# Patient Record
Sex: MALE | Race: WHITE | Employment: OTHER | URBAN - METROPOLITAN AREA
[De-identification: names, ages, dates, MRNs, and addresses within clinical notes are randomized per-mention and may not be internally consistent; named-entity substitution may affect disease eponyms.]

---

## 2017-01-16 ENCOUNTER — HOSPITAL ENCOUNTER (OUTPATIENT)
Dept: OTHER | Age: 77
Discharge: HOME | End: 2017-01-31
Attending: FAMILY MEDICINE | Admitting: FAMILY MEDICINE

## 2017-01-19 PROBLEM — I63.40 CEREBRAL EMBOLISM WITH CEREBRAL INFARCTION (HCC): Status: ACTIVE | Noted: 2017-01-19

## 2017-01-19 LAB
POC INR: 2 INDEX
PROTHROMBIN TIME, POC: 23.8 SECONDS (ref 10–14.3)

## 2017-02-01 ENCOUNTER — HOSPITAL ENCOUNTER (OUTPATIENT)
Dept: OTHER | Age: 77
Discharge: HOME | End: 2017-02-28
Attending: FAMILY MEDICINE | Admitting: FAMILY MEDICINE

## 2017-02-20 LAB
POC INR: 2 INDEX
PROTHROMBIN TIME, POC: 24.4 SECONDS (ref 10–14.3)

## 2017-03-01 ENCOUNTER — HOSPITAL ENCOUNTER (OUTPATIENT)
Dept: OTHER | Age: 77
Discharge: HOME | End: 2017-03-31
Attending: FAMILY MEDICINE | Admitting: FAMILY MEDICINE

## 2017-03-20 LAB
POC INR: 2 INDEX
PROTHROMBIN TIME, POC: 24.1 SECONDS (ref 10–14.3)

## 2017-04-01 ENCOUNTER — HOSPITAL ENCOUNTER (OUTPATIENT)
Dept: OTHER | Age: 77
Discharge: HOME | End: 2017-04-30
Attending: FAMILY MEDICINE | Admitting: FAMILY MEDICINE

## 2017-04-17 LAB
POC INR: 2.2 INDEX
PROTHROMBIN TIME, POC: 26 SECONDS (ref 10–14.3)

## 2017-05-01 ENCOUNTER — HOSPITAL ENCOUNTER (OUTPATIENT)
Dept: GENERAL RADIOLOGY | Age: 77
Discharge: HOME | End: 2017-05-01
Attending: PHYSICIAN ASSISTANT | Admitting: PHYSICIAN ASSISTANT

## 2017-05-01 ENCOUNTER — HOSPITAL ENCOUNTER (OUTPATIENT)
Dept: OTHER | Age: 77
Discharge: HOME | End: 2017-05-31
Attending: FAMILY MEDICINE | Admitting: FAMILY MEDICINE

## 2017-05-01 LAB
ALBUMIN SERPL-MCNC: 4.6 GM/DL (ref 3.4–5)
ALP BLD-CCNC: 97 IU/L (ref 40–128)
ALT SERPL-CCNC: 32 U/L (ref 10–40)
ANION GAP SERPL CALCULATED.3IONS-SCNC: 13 MMOL/L (ref 4–16)
AST SERPL-CCNC: 31 IU/L (ref 15–37)
BILIRUB SERPL-MCNC: 0.5 MG/DL (ref 0–1)
BUN BLDV-MCNC: 14 MG/DL (ref 6–23)
CALCIUM SERPL-MCNC: 9.7 MG/DL (ref 8.3–10.6)
CHLORIDE BLD-SCNC: 99 MMOL/L (ref 99–110)
CHOLESTEROL: 134 MG/DL
CO2: 28 MMOL/L (ref 21–32)
CREAT SERPL-MCNC: 1 MG/DL (ref 0.9–1.3)
ESTIMATED AVERAGE GLUCOSE: 117 MG/DL
GFR AFRICAN AMERICAN: >60 ML/MIN/1.73M2
GFR NON-AFRICAN AMERICAN: >60 ML/MIN/1.73M2
GLUCOSE FASTING: 66 MG/DL (ref 70–99)
HBA1C MFR BLD: 5.7 % (ref 4.2–6.3)
HCT VFR BLD CALC: 47.5 % (ref 42–52)
HDLC SERPL-MCNC: 42 MG/DL
HEMOGLOBIN: 15.5 GM/DL (ref 13.5–18)
LDL CHOLESTEROL CALCULATED: 40 MG/DL
MCH RBC QN AUTO: 30 PG (ref 27–31)
MCHC RBC AUTO-ENTMCNC: 32.6 % (ref 32–36)
MCV RBC AUTO: 92.1 FL (ref 78–100)
PDW BLD-RTO: 12.9 % (ref 11.7–14.9)
PLATELET # BLD: 276 K/CU MM (ref 140–440)
PMV BLD AUTO: 9.3 FL (ref 7.5–11.1)
POTASSIUM SERPL-SCNC: 4.2 MMOL/L (ref 3.5–5.1)
RBC # BLD: 5.16 M/CU MM (ref 4.6–6.2)
SODIUM BLD-SCNC: 140 MMOL/L (ref 135–145)
T4 FREE: 1.03 NG/DL (ref 0.9–1.8)
TOTAL PROTEIN: 7.5 GM/DL (ref 6.4–8.2)
TRIGL SERPL-MCNC: 258 MG/DL
TSH HIGH SENSITIVITY: 3.15 UIU/ML (ref 0.27–4.2)
WBC # BLD: 7 K/CU MM (ref 4–10.5)

## 2017-05-03 LAB
PROSTATE SPECIFIC ANTIGEN FREE: 0.3
PROSTATE SPECIFIC ANTIGEN PERCENT FREE: 11
PROSTATE SPECIFIC ANTIGEN: 2.8

## 2017-05-15 LAB
POC INR: 1.8 INDEX
PROTHROMBIN TIME, POC: 21.5 SECONDS (ref 10–14.3)

## 2017-06-01 ENCOUNTER — HOSPITAL ENCOUNTER (OUTPATIENT)
Dept: OTHER | Age: 77
Discharge: HOME | End: 2017-06-30
Attending: FAMILY MEDICINE | Admitting: FAMILY MEDICINE

## 2017-06-19 LAB
POC INR: 2.4 INDEX
PROTHROMBIN TIME, POC: 28.9 SECONDS (ref 10–14.3)

## 2017-07-01 ENCOUNTER — HOSPITAL ENCOUNTER (OUTPATIENT)
Dept: OTHER | Age: 77
Discharge: HOME | End: 2017-07-31
Attending: FAMILY MEDICINE | Admitting: FAMILY MEDICINE

## 2017-07-20 PROBLEM — I69.393 ATAXIA DUE TO RECENT STROKE: Status: ACTIVE | Noted: 2017-07-20

## 2017-07-20 PROBLEM — R41.89 IMPAIRED COGNITION: Status: ACTIVE | Noted: 2017-07-20

## 2017-08-01 ENCOUNTER — HOSPITAL ENCOUNTER (OUTPATIENT)
Dept: OTHER | Age: 77
Discharge: HOME | End: 2017-08-31
Attending: FAMILY MEDICINE | Admitting: FAMILY MEDICINE

## 2017-08-14 LAB
POC INR: 3.6 INDEX
PROTHROMBIN TIME, POC: 43.4 SECONDS (ref 10–14.3)

## 2017-08-17 LAB
POC INR: 5.7 INDEX
PROTHROMBIN TIME, POC: 68.6 SECONDS (ref 10–14.3)

## 2017-08-21 LAB
POC INR: 1.4 INDEX
PROTHROMBIN TIME, POC: 16.7 SECONDS (ref 10–14.3)

## 2017-08-28 LAB
POC INR: 2 INDEX
PROTHROMBIN TIME, POC: 24 SECONDS (ref 10–14.3)

## 2017-09-01 ENCOUNTER — HOSPITAL ENCOUNTER (OUTPATIENT)
Dept: OTHER | Age: 77
Discharge: HOME | End: 2017-09-30
Attending: FAMILY MEDICINE | Admitting: FAMILY MEDICINE

## 2017-09-11 LAB
POC INR: 1.8 INDEX
PROTHROMBIN TIME, POC: 21.5 SECONDS (ref 10–14.3)

## 2017-09-25 LAB
POC INR: 1.3 INDEX
POC INR: 4.5 INDEX
PROTHROMBIN TIME, POC: 15.2 SECONDS (ref 10–14.3)
PROTHROMBIN TIME, POC: 53.9 SECONDS (ref 10–14.3)

## 2017-10-01 ENCOUNTER — HOSPITAL ENCOUNTER (OUTPATIENT)
Dept: OTHER | Age: 77
Discharge: HOME | End: 2017-10-31
Attending: FAMILY MEDICINE | Admitting: FAMILY MEDICINE

## 2017-10-02 LAB
POC INR: 1.7 INDEX
PROTHROMBIN TIME, POC: 20 SECONDS (ref 10–14.3)

## 2017-10-05 LAB
POC INR: 1.9 INDEX
PROTHROMBIN TIME, POC: 22.3 SECONDS (ref 10–14.3)

## 2017-10-16 LAB
POC INR: 1.7 INDEX
PROTHROMBIN TIME, POC: 20 SECONDS (ref 10–14.3)

## 2017-10-26 LAB
POC INR: 2.9 INDEX
PROTHROMBIN TIME, POC: 34.3 SECONDS (ref 10–14.3)

## 2017-11-01 ENCOUNTER — HOSPITAL ENCOUNTER (OUTPATIENT)
Dept: OTHER | Age: 77
Discharge: HOME | End: 2017-11-30
Attending: FAMILY MEDICINE | Admitting: INTERNAL MEDICINE

## 2017-11-07 ENCOUNTER — HOSPITAL ENCOUNTER (OUTPATIENT)
Dept: GENERAL RADIOLOGY | Age: 77
Discharge: HOME | End: 2017-11-07
Attending: NURSE PRACTITIONER | Admitting: NURSE PRACTITIONER

## 2017-11-07 LAB
ALBUMIN SERPL-MCNC: 4.9 GM/DL (ref 3.4–5)
ALP BLD-CCNC: 80 IU/L (ref 40–128)
ALT SERPL-CCNC: 65 U/L (ref 10–40)
ANION GAP SERPL CALCULATED.3IONS-SCNC: 12 MMOL/L (ref 4–16)
AST SERPL-CCNC: 54 IU/L (ref 15–37)
BILIRUB SERPL-MCNC: 0.6 MG/DL (ref 0–1)
BUN BLDV-MCNC: 19 MG/DL (ref 6–23)
CALCIUM SERPL-MCNC: 9.5 MG/DL (ref 8.3–10.6)
CHLORIDE BLD-SCNC: 105 MMOL/L (ref 99–110)
CHOLESTEROL: 141 MG/DL
CO2: 26 MMOL/L (ref 21–32)
CREAT SERPL-MCNC: 1 MG/DL (ref 0.9–1.3)
ESTIMATED AVERAGE GLUCOSE: 111 MG/DL
GFR AFRICAN AMERICAN: >60 ML/MIN/1.73M2
GFR NON-AFRICAN AMERICAN: >60 ML/MIN/1.73M2
GLUCOSE FASTING: 81 MG/DL (ref 70–99)
HBA1C MFR BLD: 5.5 % (ref 4.2–6.3)
HCT VFR BLD CALC: 46.1 % (ref 42–52)
HDLC SERPL-MCNC: 50 MG/DL
HEMOGLOBIN: 15.1 GM/DL (ref 13.5–18)
LDL CHOLESTEROL DIRECT: 70 MG/DL
MCH RBC QN AUTO: 30.6 PG (ref 27–31)
MCHC RBC AUTO-ENTMCNC: 32.8 % (ref 32–36)
MCV RBC AUTO: 93.3 FL (ref 78–100)
PDW BLD-RTO: 12.3 % (ref 11.7–14.9)
PLATELET # BLD: 204 K/CU MM (ref 140–440)
PMV BLD AUTO: 9.9 FL (ref 7.5–11.1)
POTASSIUM SERPL-SCNC: 4.4 MMOL/L (ref 3.5–5.1)
RBC # BLD: 4.94 M/CU MM (ref 4.6–6.2)
SODIUM BLD-SCNC: 143 MMOL/L (ref 135–145)
T4 FREE: 0.87 NG/DL (ref 0.9–1.8)
TOTAL PROTEIN: 7.4 GM/DL (ref 6.4–8.2)
TRIGL SERPL-MCNC: 171 MG/DL
TSH HIGH SENSITIVITY: 3.13 UIU/ML (ref 0.27–4.2)
WBC # BLD: 5.6 K/CU MM (ref 4–10.5)

## 2017-11-08 LAB
PROSTATE SPECIFIC ANTIGEN FREE: 0.4
PROSTATE SPECIFIC ANTIGEN PERCENT FREE: 12
PROSTATE SPECIFIC ANTIGEN: 3.2

## 2017-11-20 LAB
POC INR: 2.7 INDEX
PROTHROMBIN TIME, POC: 32.9 SECONDS (ref 10–14.3)

## 2017-12-01 ENCOUNTER — HOSPITAL ENCOUNTER (OUTPATIENT)
Dept: OTHER | Age: 77
Discharge: HOME | End: 2017-12-31
Attending: INTERNAL MEDICINE | Admitting: INTERNAL MEDICINE

## 2017-12-21 LAB
POC INR: 2.5 INDEX
PROTHROMBIN TIME, POC: 30.1 SECONDS (ref 10–14.3)

## 2018-01-01 ENCOUNTER — HOSPITAL ENCOUNTER (OUTPATIENT)
Dept: OTHER | Age: 78
Discharge: HOME | End: 2018-01-31
Attending: INTERNAL MEDICINE | Admitting: INTERNAL MEDICINE

## 2018-03-12 ENCOUNTER — HOSPITAL ENCOUNTER (OUTPATIENT)
Dept: SLEEP CENTER | Age: 78
Discharge: HOME | End: 2018-03-12
Attending: INTERNAL MEDICINE | Admitting: INTERNAL MEDICINE

## 2018-03-12 VITALS — HEIGHT: 72 IN | BODY MASS INDEX: 27.09 KG/M2 | WEIGHT: 200 LBS

## 2018-03-12 DIAGNOSIS — Z99.89 OSA ON CPAP: ICD-10-CM

## 2018-03-12 DIAGNOSIS — G47.33 OSA ON CPAP: ICD-10-CM

## 2018-09-18 ENCOUNTER — HOSPITAL ENCOUNTER (OUTPATIENT)
Dept: SLEEP CENTER | Age: 78
Discharge: HOME | End: 2018-09-18
Attending: INTERNAL MEDICINE | Admitting: INTERNAL MEDICINE

## 2018-09-18 DIAGNOSIS — G47.33 OBSTRUCTIVE SLEEP APNEA SYNDROME: ICD-10-CM

## 2018-09-19 NOTE — PROGRESS NOTES
9/19/2018  sleep study  for Rina Bird  1940 is complete. Results are pending physician review.     Electronically signed by Yudelka Redd on 9/19/2018 at 6:52 AM

## 2018-09-22 NOTE — PROGRESS NOTES
Results for the most recent sleep study on Kristi Mckeon  1940 are finalized and available. Please see media tab.     Electronically signed by Christine Anderson on 9/22/2018 at 5:34 AM

## 2018-10-30 ENCOUNTER — HOSPITAL ENCOUNTER (OUTPATIENT)
Age: 78
Discharge: HOME | End: 2018-10-30
Payer: MEDICARE

## 2018-10-30 LAB
ALBUMIN SERPL-MCNC: 4.3 GM/DL (ref 3.4–5)
ALP BLD-CCNC: 87 IU/L (ref 40–128)
ALT SERPL-CCNC: 49 U/L (ref 10–40)
ANION GAP SERPL CALCULATED.3IONS-SCNC: 17 MMOL/L (ref 4–16)
AST SERPL-CCNC: 43 IU/L (ref 15–37)
BILIRUB SERPL-MCNC: 0.5 MG/DL (ref 0–1)
BUN BLDV-MCNC: 22 MG/DL (ref 6–23)
CALCIUM SERPL-MCNC: 9.4 MG/DL (ref 8.3–10.6)
CHLORIDE BLD-SCNC: 103 MMOL/L (ref 99–110)
CHOLESTEROL: 170 MG/DL
CO2: 17 MMOL/L (ref 21–32)
CREAT SERPL-MCNC: 0.9 MG/DL (ref 0.9–1.3)
ESTIMATED AVERAGE GLUCOSE: 120 MG/DL
GFR AFRICAN AMERICAN: >60 ML/MIN/1.73M2
GFR NON-AFRICAN AMERICAN: >60 ML/MIN/1.73M2
GLUCOSE FASTING: 108 MG/DL (ref 70–99)
HBA1C MFR BLD: 5.8 % (ref 4.2–6.3)
HCT VFR BLD CALC: 51.6 % (ref 42–52)
HDLC SERPL-MCNC: 40 MG/DL
HEMOGLOBIN: 15.7 GM/DL (ref 13.5–18)
LDL CHOLESTEROL DIRECT: 90 MG/DL
MCH RBC QN AUTO: 30.7 PG (ref 27–31)
MCHC RBC AUTO-ENTMCNC: 30.4 % (ref 32–36)
MCV RBC AUTO: 101 FL (ref 78–100)
PDW BLD-RTO: 12.7 % (ref 11.7–14.9)
PLATELET # BLD: 203 K/CU MM (ref 140–440)
PMV BLD AUTO: 9.7 FL (ref 7.5–11.1)
POTASSIUM SERPL-SCNC: 4.7 MMOL/L (ref 3.5–5.1)
PROSTATE SPECIFIC ANTIGEN: 4.12 NG/ML (ref 0–4)
RBC # BLD: 5.11 M/CU MM (ref 4.6–6.2)
SODIUM BLD-SCNC: 137 MMOL/L (ref 135–145)
T4 FREE: 1.07 NG/DL (ref 0.9–1.8)
TOTAL PROTEIN: 7 GM/DL (ref 6.4–8.2)
TRIGL SERPL-MCNC: 361 MG/DL
TSH HIGH SENSITIVITY: 3.38 UIU/ML (ref 0.27–4.2)
WBC # BLD: 5.9 K/CU MM (ref 4–10.5)

## 2018-10-30 PROCEDURE — 85027 COMPLETE CBC AUTOMATED: CPT

## 2018-10-30 PROCEDURE — 84443 ASSAY THYROID STIM HORMONE: CPT

## 2018-10-30 PROCEDURE — 80061 LIPID PANEL: CPT

## 2018-10-30 PROCEDURE — 84439 ASSAY OF FREE THYROXINE: CPT

## 2018-10-30 PROCEDURE — G0103 PSA SCREENING: HCPCS

## 2018-10-30 PROCEDURE — 83721 ASSAY OF BLOOD LIPOPROTEIN: CPT

## 2018-10-30 PROCEDURE — 36415 COLL VENOUS BLD VENIPUNCTURE: CPT

## 2018-10-30 PROCEDURE — 83036 HEMOGLOBIN GLYCOSYLATED A1C: CPT

## 2018-10-30 PROCEDURE — 80053 COMPREHEN METABOLIC PANEL: CPT

## 2019-06-23 ENCOUNTER — APPOINTMENT (OUTPATIENT)
Dept: ULTRASOUND IMAGING | Age: 79
End: 2019-06-23
Payer: MEDICARE

## 2019-06-23 ENCOUNTER — HOSPITAL ENCOUNTER (OUTPATIENT)
Age: 79
Setting detail: OBSERVATION
Discharge: HOME | End: 2019-06-25
Attending: EMERGENCY MEDICINE | Admitting: INTERNAL MEDICINE
Payer: MEDICARE

## 2019-06-23 ENCOUNTER — APPOINTMENT (OUTPATIENT)
Dept: CT IMAGING | Age: 79
End: 2019-06-23
Payer: MEDICARE

## 2019-06-23 ENCOUNTER — APPOINTMENT (OUTPATIENT)
Dept: GENERAL RADIOLOGY | Age: 79
End: 2019-06-23
Payer: MEDICARE

## 2019-06-23 DIAGNOSIS — R41.82 ALTERED MENTAL STATUS, UNSPECIFIED ALTERED MENTAL STATUS TYPE: Primary | ICD-10-CM

## 2019-06-23 DIAGNOSIS — R79.89 ELEVATED LACTIC ACID LEVEL: ICD-10-CM

## 2019-06-23 DIAGNOSIS — R79.1 ELEVATED INR: ICD-10-CM

## 2019-06-23 PROBLEM — G93.40 ACUTE ENCEPHALOPATHY: Status: ACTIVE | Noted: 2019-06-23

## 2019-06-23 LAB
ALBUMIN SERPL-MCNC: 3.9 GM/DL (ref 3.4–5)
ALP BLD-CCNC: 96 IU/L (ref 40–129)
ALT SERPL-CCNC: 58 U/L (ref 10–40)
AMPHETAMINES: NEGATIVE
ANION GAP SERPL CALCULATED.3IONS-SCNC: 10 MMOL/L (ref 4–16)
AST SERPL-CCNC: 40 IU/L (ref 15–37)
BACTERIA: NEGATIVE /HPF
BARBITURATE SCREEN URINE: NEGATIVE
BASE EXCESS MIXED: ABNORMAL (ref 0–1.2)
BASOPHILS ABSOLUTE: 0 K/CU MM
BASOPHILS RELATIVE PERCENT: 0.5 % (ref 0–1)
BENZODIAZEPINE SCREEN, URINE: NEGATIVE
BILIRUB SERPL-MCNC: 0.4 MG/DL (ref 0–1)
BILIRUBIN URINE: NEGATIVE MG/DL
BLOOD, URINE: ABNORMAL
BUN BLDV-MCNC: 23 MG/DL (ref 6–23)
CALCIUM SERPL-MCNC: 9.6 MG/DL (ref 8.3–10.6)
CANNABINOID SCREEN URINE: NEGATIVE
CHLORIDE BLD-SCNC: 107 MMOL/L (ref 99–110)
CLARITY: CLEAR
CO2: 23 MMOL/L (ref 21–32)
COCAINE METABOLITE: NEGATIVE
COLOR: YELLOW
COMMENT: ABNORMAL
CREAT SERPL-MCNC: 1 MG/DL (ref 0.9–1.3)
DIFFERENTIAL TYPE: ABNORMAL
EOSINOPHILS ABSOLUTE: 0.1 K/CU MM
EOSINOPHILS RELATIVE PERCENT: 1 % (ref 0–3)
GFR AFRICAN AMERICAN: >60 ML/MIN/1.73M2
GFR NON-AFRICAN AMERICAN: >60 ML/MIN/1.73M2
GLUCOSE BLD-MCNC: 94 MG/DL
GLUCOSE BLD-MCNC: 94 MG/DL (ref 70–99)
GLUCOSE BLD-MCNC: 95 MG/DL (ref 70–99)
GLUCOSE, URINE: NEGATIVE MG/DL
HCO3 VENOUS: 23.8 MMOL/L (ref 19–25)
HCT VFR BLD CALC: 44 % (ref 42–52)
HEMOGLOBIN: 14.6 GM/DL (ref 13.5–18)
IMMATURE NEUTROPHIL %: 0.3 % (ref 0–0.43)
INR BLD: 5.75 INDEX
KETONES, URINE: NEGATIVE MG/DL
LACTATE: 1.3 MMOL/L (ref 0.4–2)
LACTATE: ABNORMAL MMOL/L (ref 0.4–2)
LEUKOCYTE ESTERASE, URINE: NEGATIVE
LIPASE: 31 IU/L (ref 13–60)
LYMPHOCYTES ABSOLUTE: 1.6 K/CU MM
LYMPHOCYTES RELATIVE PERCENT: 27.1 % (ref 24–44)
MCH RBC QN AUTO: 30.8 PG (ref 27–31)
MCHC RBC AUTO-ENTMCNC: 33.2 % (ref 32–36)
MCV RBC AUTO: 92.8 FL (ref 78–100)
MONOCYTES ABSOLUTE: 0.5 K/CU MM
MONOCYTES RELATIVE PERCENT: 7.9 % (ref 0–4)
MUCUS: ABNORMAL HPF
NITRITE URINE, QUANTITATIVE: NEGATIVE
NUCLEATED RBC %: 0 %
O2 SAT, VEN: 55.8 % (ref 50–70)
OPIATES, URINE: NEGATIVE
OXYCODONE: NORMAL
PCO2, VEN: 35 MMHG (ref 38–52)
PDW BLD-RTO: 12 % (ref 11.7–14.9)
PH VENOUS: 7.44 (ref 7.32–7.42)
PH, URINE: 5 (ref 5–8)
PHENCYCLIDINE, URINE: NEGATIVE
PLATELET # BLD: 221 K/CU MM (ref 140–440)
PMV BLD AUTO: 9.2 FL (ref 7.5–11.1)
PO2, VEN: 25 MMHG (ref 28–48)
POTASSIUM SERPL-SCNC: 4.4 MMOL/L (ref 3.5–5.1)
PRO-BNP: 151.4 PG/ML
PROTEIN UA: NEGATIVE MG/DL
PROTHROMBIN TIME: 65.6 SECONDS (ref 9.12–12.5)
RBC # BLD: 4.74 M/CU MM (ref 4.6–6.2)
RBC URINE: 6 /HPF (ref 0–3)
SEGMENTED NEUTROPHILS ABSOLUTE COUNT: 3.8 K/CU MM
SEGMENTED NEUTROPHILS RELATIVE PERCENT: 63.2 % (ref 36–66)
SODIUM BLD-SCNC: 140 MMOL/L (ref 135–145)
SPECIFIC GRAVITY UA: 1.02 (ref 1–1.03)
TOTAL IMMATURE NEUTOROPHIL: 0.02 K/CU MM
TOTAL NUCLEATED RBC: 0 K/CU MM
TOTAL PROTEIN: 6.8 GM/DL (ref 6.4–8.2)
TRICHOMONAS: ABNORMAL /HPF
TROPONIN T: <0.01 NG/ML
TROPONIN T: <0.01 NG/ML
TSH HIGH SENSITIVITY: 2.15 UIU/ML (ref 0.27–4.2)
UROBILINOGEN, URINE: NORMAL MG/DL (ref 0.2–1)
WBC # BLD: 6 K/CU MM (ref 4–10.5)
WBC UA: 1 /HPF (ref 0–2)

## 2019-06-23 PROCEDURE — 99285 EMERGENCY DEPT VISIT HI MDM: CPT

## 2019-06-23 PROCEDURE — 6370000000 HC RX 637 (ALT 250 FOR IP): Performed by: INTERNAL MEDICINE

## 2019-06-23 PROCEDURE — 87040 BLOOD CULTURE FOR BACTERIA: CPT

## 2019-06-23 PROCEDURE — 71045 X-RAY EXAM CHEST 1 VIEW: CPT

## 2019-06-23 PROCEDURE — 93010 ELECTROCARDIOGRAM REPORT: CPT | Performed by: INTERNAL MEDICINE

## 2019-06-23 PROCEDURE — 80053 COMPREHEN METABOLIC PANEL: CPT

## 2019-06-23 PROCEDURE — 82962 GLUCOSE BLOOD TEST: CPT

## 2019-06-23 PROCEDURE — 83690 ASSAY OF LIPASE: CPT

## 2019-06-23 PROCEDURE — 93880 EXTRACRANIAL BILAT STUDY: CPT

## 2019-06-23 PROCEDURE — 87086 URINE CULTURE/COLONY COUNT: CPT

## 2019-06-23 PROCEDURE — 84484 ASSAY OF TROPONIN QUANT: CPT

## 2019-06-23 PROCEDURE — 2580000003 HC RX 258: Performed by: INTERNAL MEDICINE

## 2019-06-23 PROCEDURE — 82805 BLOOD GASES W/O2 SATURATION: CPT

## 2019-06-23 PROCEDURE — 96360 HYDRATION IV INFUSION INIT: CPT

## 2019-06-23 PROCEDURE — 85025 COMPLETE CBC W/AUTO DIFF WBC: CPT

## 2019-06-23 PROCEDURE — 2580000003 HC RX 258: Performed by: EMERGENCY MEDICINE

## 2019-06-23 PROCEDURE — 93005 ELECTROCARDIOGRAM TRACING: CPT | Performed by: EMERGENCY MEDICINE

## 2019-06-23 PROCEDURE — 83605 ASSAY OF LACTIC ACID: CPT

## 2019-06-23 PROCEDURE — 83880 ASSAY OF NATRIURETIC PEPTIDE: CPT

## 2019-06-23 PROCEDURE — 80307 DRUG TEST PRSMV CHEM ANLYZR: CPT

## 2019-06-23 PROCEDURE — 85610 PROTHROMBIN TIME: CPT

## 2019-06-23 PROCEDURE — 96361 HYDRATE IV INFUSION ADD-ON: CPT

## 2019-06-23 PROCEDURE — 36415 COLL VENOUS BLD VENIPUNCTURE: CPT

## 2019-06-23 PROCEDURE — 1200000000 HC SEMI PRIVATE

## 2019-06-23 PROCEDURE — 70450 CT HEAD/BRAIN W/O DYE: CPT

## 2019-06-23 PROCEDURE — 84443 ASSAY THYROID STIM HORMONE: CPT

## 2019-06-23 PROCEDURE — 81001 URINALYSIS AUTO W/SCOPE: CPT

## 2019-06-23 RX ORDER — DEXTROSE AND SODIUM CHLORIDE 5; .45 G/100ML; G/100ML
INJECTION, SOLUTION INTRAVENOUS CONTINUOUS
Status: DISCONTINUED | OUTPATIENT
Start: 2019-06-23 | End: 2019-06-24

## 2019-06-23 RX ORDER — ACETAMINOPHEN 325 MG/1
650 TABLET ORAL EVERY 4 HOURS PRN
Status: DISCONTINUED | OUTPATIENT
Start: 2019-06-23 | End: 2019-06-25 | Stop reason: HOSPADM

## 2019-06-23 RX ORDER — AMLODIPINE BESYLATE 5 MG/1
5 TABLET ORAL DAILY
Status: DISCONTINUED | OUTPATIENT
Start: 2019-06-23 | End: 2019-06-25 | Stop reason: HOSPADM

## 2019-06-23 RX ORDER — CARVEDILOL 3.12 MG/1
3.12 TABLET ORAL 2 TIMES DAILY WITH MEALS
Status: DISCONTINUED | OUTPATIENT
Start: 2019-06-23 | End: 2019-06-25 | Stop reason: HOSPADM

## 2019-06-23 RX ORDER — SODIUM CHLORIDE 0.9 % (FLUSH) 0.9 %
10 SYRINGE (ML) INJECTION EVERY 12 HOURS SCHEDULED
Status: DISCONTINUED | OUTPATIENT
Start: 2019-06-23 | End: 2019-06-25 | Stop reason: HOSPADM

## 2019-06-23 RX ORDER — SODIUM CHLORIDE 0.9 % (FLUSH) 0.9 %
10 SYRINGE (ML) INJECTION PRN
Status: DISCONTINUED | OUTPATIENT
Start: 2019-06-23 | End: 2019-06-25 | Stop reason: HOSPADM

## 2019-06-23 RX ORDER — SODIUM CHLORIDE 9 MG/ML
INJECTION, SOLUTION INTRAVENOUS CONTINUOUS
Status: DISCONTINUED | OUTPATIENT
Start: 2019-06-23 | End: 2019-06-23

## 2019-06-23 RX ORDER — B12/LEVOMEFOLATE CALCIUM/B-6 2-1.13-25
1 TABLET ORAL DAILY
Status: DISCONTINUED | OUTPATIENT
Start: 2019-06-23 | End: 2019-06-25 | Stop reason: HOSPADM

## 2019-06-23 RX ORDER — SIMVASTATIN 40 MG
40 TABLET ORAL NIGHTLY
Status: DISCONTINUED | OUTPATIENT
Start: 2019-06-23 | End: 2019-06-25 | Stop reason: HOSPADM

## 2019-06-23 RX ORDER — DONEPEZIL HYDROCHLORIDE 10 MG/1
10 TABLET, FILM COATED ORAL NIGHTLY
Status: DISCONTINUED | OUTPATIENT
Start: 2019-06-23 | End: 2019-06-25 | Stop reason: HOSPADM

## 2019-06-23 RX ORDER — 0.9 % SODIUM CHLORIDE 0.9 %
500 INTRAVENOUS SOLUTION INTRAVENOUS ONCE
Status: COMPLETED | OUTPATIENT
Start: 2019-06-23 | End: 2019-06-23

## 2019-06-23 RX ORDER — ONDANSETRON 2 MG/ML
4 INJECTION INTRAMUSCULAR; INTRAVENOUS EVERY 6 HOURS PRN
Status: DISCONTINUED | OUTPATIENT
Start: 2019-06-23 | End: 2019-06-25 | Stop reason: HOSPADM

## 2019-06-23 RX ORDER — ROPINIROLE 1 MG/1
1 TABLET, FILM COATED ORAL 2 TIMES DAILY
Status: DISCONTINUED | OUTPATIENT
Start: 2019-06-23 | End: 2019-06-25 | Stop reason: HOSPADM

## 2019-06-23 RX ORDER — TAMSULOSIN HYDROCHLORIDE 0.4 MG/1
0.4 CAPSULE ORAL DAILY
Status: DISCONTINUED | OUTPATIENT
Start: 2019-06-23 | End: 2019-06-25 | Stop reason: HOSPADM

## 2019-06-23 RX ORDER — NITROGLYCERIN 0.4 MG/1
0.4 TABLET SUBLINGUAL EVERY 5 MIN PRN
Status: DISCONTINUED | OUTPATIENT
Start: 2019-06-23 | End: 2019-06-25 | Stop reason: HOSPADM

## 2019-06-23 RX ORDER — ASPIRIN AND DIPYRIDAMOLE 25; 200 MG/1; MG/1
1 CAPSULE, EXTENDED RELEASE ORAL 2 TIMES DAILY
Status: DISCONTINUED | OUTPATIENT
Start: 2019-06-23 | End: 2019-06-25 | Stop reason: HOSPADM

## 2019-06-23 RX ORDER — FLUOXETINE HYDROCHLORIDE 20 MG/1
40 CAPSULE ORAL DAILY
Status: DISCONTINUED | OUTPATIENT
Start: 2019-06-23 | End: 2019-06-25 | Stop reason: HOSPADM

## 2019-06-23 RX ORDER — MIRTAZAPINE 15 MG/1
15 TABLET, FILM COATED ORAL NIGHTLY
COMMUNITY

## 2019-06-23 RX ORDER — LISINOPRIL 10 MG/1
10 TABLET ORAL DAILY
Status: DISCONTINUED | OUTPATIENT
Start: 2019-06-23 | End: 2019-06-25 | Stop reason: HOSPADM

## 2019-06-23 RX ADMIN — FLUOXETINE 40 MG: 20 CAPSULE ORAL at 18:47

## 2019-06-23 RX ADMIN — ROPINIROLE HYDROCHLORIDE 1 MG: 1 TABLET, FILM COATED ORAL at 23:16

## 2019-06-23 RX ADMIN — DONEPEZIL HYDROCHLORIDE 10 MG: 10 TABLET, FILM COATED ORAL at 23:16

## 2019-06-23 RX ADMIN — ASPIRIN AND EXTENDED-RELEASE DIPYRIDAMOLE 1 CAPSULE: 25; 200 CAPSULE ORAL at 23:15

## 2019-06-23 RX ADMIN — LISINOPRIL 10 MG: 10 TABLET ORAL at 18:48

## 2019-06-23 RX ADMIN — AMLODIPINE BESYLATE 5 MG: 5 TABLET ORAL at 18:48

## 2019-06-23 RX ADMIN — TAMSULOSIN HYDROCHLORIDE 0.4 MG: 0.4 CAPSULE ORAL at 18:48

## 2019-06-23 RX ADMIN — SIMVASTATIN 40 MG: 40 TABLET, FILM COATED ORAL at 23:15

## 2019-06-23 RX ADMIN — Medication 1 TABLET: at 18:48

## 2019-06-23 RX ADMIN — DEXTROSE AND SODIUM CHLORIDE: 5; 450 INJECTION, SOLUTION INTRAVENOUS at 18:47

## 2019-06-23 RX ADMIN — SODIUM CHLORIDE 500 ML: 9 INJECTION, SOLUTION INTRAVENOUS at 11:43

## 2019-06-23 RX ADMIN — CARVEDILOL 3.12 MG: 3.12 TABLET, FILM COATED ORAL at 18:48

## 2019-06-23 ASSESSMENT — PAIN SCALES - GENERAL
PAINLEVEL_OUTOF10: 0
PAINLEVEL_OUTOF10: 0

## 2019-06-23 NOTE — ED PROVIDER NOTES
insecurity:     Worry: Not on file     Inability: Not on file    Transportation needs:     Medical: Not on file     Non-medical: Not on file   Tobacco Use    Smoking status: Never Smoker    Smokeless tobacco: Never Used   Substance and Sexual Activity    Alcohol use: No    Drug use: No    Sexual activity: Yes   Lifestyle    Physical activity:     Days per week: Not on file     Minutes per session: Not on file    Stress: Not on file   Relationships    Social connections:     Talks on phone: Not on file     Gets together: Not on file     Attends Mandaeism service: Not on file     Active member of club or organization: Not on file     Attends meetings of clubs or organizations: Not on file     Relationship status: Not on file    Intimate partner violence:     Fear of current or ex partner: Not on file     Emotionally abused: Not on file     Physically abused: Not on file     Forced sexual activity: Not on file   Other Topics Concern    Not on file   Social History Narrative    Not on file     No current facility-administered medications for this encounter.       Current Outpatient Medications   Medication Sig Dispense Refill    warfarin (COUMADIN) 2.5 MG tablet Take 2 tablets by mouth daily 60 tablet 2    lisinopril (PRINIVIL;ZESTRIL) 10 MG tablet Take 1 tablet by mouth daily 30 tablet 0    rOPINIRole (REQUIP) 1 MG tablet Take 1 tablet by mouth 2 times daily 60 tablet 0    carvedilol (COREG) 3.125 MG tablet Take 1 tablet by mouth 2 times daily (with meals) 60 tablet 0    folic acid-pyridoxine-cyancobalamin (FOLTX) 1.13-25-2 MG TABS Take 1 tablet by mouth daily 30 tablet 0    dipyridamole-aspirin (AGGRENOX)  MG per extended release capsule Take 1 capsule by mouth 2 times daily 60 capsule 0    donepezil (ARICEPT) 10 MG tablet Take 1 tablet by mouth nightly 30 tablet 0    FLUoxetine (PROZAC) 20 MG capsule Take 40 mg by mouth daily      tamsulosin (FLOMAX) 0.4 MG capsule Take 0.4 mg by mouth daily 11    amLODIPine (NORVASC) 5 MG tablet Take 5 mg by mouth daily.  simvastatin (ZOCOR) 80 MG tablet Take 40 mg by mouth nightly       nitroGLYCERIN (NITROSTAT) 0.4 MG SL tablet Place 0.4 mg under the tongue every 5 minutes as needed. No Known Allergies    Nursing Notes Reviewed     Physical Exam:   ED Triage Vitals   Enc Vitals Group      BP       Pulse       Resp       Temp       Temp src       SpO2       Weight       Height       Head Circumference       Peak Flow       Pain Score       Pain Loc       Pain Edu? Excl. in [address]? BP (!) 153/88   Pulse 59   Temp 97.5 °F (36.4 °C) (Oral)   Resp 18   Ht 6' 1\" (1.854 m)   Wt 183 lb (83 kg)   SpO2 99%   BMI 24.14 kg/m²   My pulse ox interpretation is - normal  Physical Exam   Constitutional: He appears well-developed and well-nourished. Appears sickly     HENT:   Head: Normocephalic and atraumatic. Eyes: Conjunctivae are normal. Right eye exhibits no discharge. Left eye exhibits no discharge. Cardiovascular: Normal rate and regular rhythm. Pulmonary/Chest: Effort normal and breath sounds normal. No respiratory distress. Abdominal: Soft. He exhibits no distension. There is no tenderness. Musculoskeletal: Normal range of motion. He exhibits no deformity. Neurological: He is alert. He has normal strength. No cranial nerve deficit or sensory deficit. He exhibits normal muscle tone. Coordination normal.   5/5 strength in lateral upper and lower extremities. Oriented to person. Knows he is in a hospital but is unsure which one. He is unable to name the president. Skin: Skin is warm and dry. He is not diaphoretic. Psychiatric: He has a normal mood and affect.  His behavior is normal.       I have reviewed and interpreted all of the currently available lab results from this visit (if applicable):  Results for orders placed or performed during the hospital encounter of 06/23/19   CBC Auto Differential   Result Value Ref Range    WBC 6.0 4.0 - 10.5 K/CU MM    RBC 4.74 4.6 - 6.2 M/CU MM    Hemoglobin 14.6 13.5 - 18.0 GM/DL    Hematocrit 44.0 42 - 52 %    MCV 92.8 78 - 100 FL    MCH 30.8 27 - 31 PG    MCHC 33.2 32.0 - 36.0 %    RDW 12.0 11.7 - 14.9 %    Platelets 883 799 - 309 K/CU MM    MPV 9.2 7.5 - 11.1 FL    Differential Type AUTOMATED DIFFERENTIAL     Segs Relative 63.2 36 - 66 %    Lymphocytes % 27.1 24 - 44 %    Monocytes % 7.9 (H) 0 - 4 %    Eosinophils % 1.0 0 - 3 %    Basophils % 0.5 0 - 1 %    Segs Absolute 3.8 K/CU MM    Lymphocytes # 1.6 K/CU MM    Monocytes # 0.5 K/CU MM    Eosinophils # 0.1 K/CU MM    Basophils # 0.0 K/CU MM    Nucleated RBC % 0.0 %    Total Nucleated RBC 0.0 K/CU MM    Total Immature Neutrophil 0.02 K/CU MM    Immature Neutrophil % 0.3 0 - 0.43 %   Comprehensive Metabolic Panel w/ Reflex to MG   Result Value Ref Range    Sodium 140 135 - 145 MMOL/L    Potassium 4.4 3.5 - 5.1 MMOL/L    Chloride 107 99 - 110 mMol/L    CO2 23 21 - 32 MMOL/L    BUN 23 6 - 23 MG/DL    CREATININE 1.0 0.9 - 1.3 MG/DL    Glucose 95 70 - 99 MG/DL    Calcium 9.6 8.3 - 10.6 MG/DL    Alb 3.9 3.4 - 5.0 GM/DL    Total Protein 6.8 6.4 - 8.2 GM/DL    Total Bilirubin 0.4 0.0 - 1.0 MG/DL    ALT 58 (H) 10 - 40 U/L    AST 40 (H) 15 - 37 IU/L    Alkaline Phosphatase 96 40 - 129 IU/L    GFR Non-African American >60 >60 mL/min/1.73m2    GFR African American >60 >60 mL/min/1.73m2    Anion Gap 10 4 - 16   Lipase   Result Value Ref Range    Lipase 31 13 - 60 IU/L   Troponin   Result Value Ref Range    Troponin T <0.010 <0.01 NG/ML   Brain Natriuretic Peptide   Result Value Ref Range    Pro-.4 <300 PG/ML   Protime-INR   Result Value Ref Range    Protime 65.6 (H) 9.12 - 12.5 SECONDS    INR 5.75 (HH) INDEX   TSH without Reflex   Result Value Ref Range    TSH, High Sensitivity 2.150 0.270 - 4.20 uIu/ml   Urinalysis, reflex to microscopic   Result Value Ref Range    Color, UA YELLOW YELLOW    Clarity, UA CLEAR CLEAR    Glucose, Urine NEGATIVE NEGATIVE MG/DL    Bilirubin Urine NEGATIVE NEGATIVE MG/DL    Ketones, Urine NEGATIVE NEGATIVE MG/DL    Specific Gravity, UA 1.018 1.001 - 1.035    Blood, Urine SMALL (A) NEGATIVE    pH, Urine 5.0 5.0 - 8.0    Protein, UA NEGATIVE NEGATIVE MG/DL    Urobilinogen, Urine NORMAL 0.2 - 1.0 MG/DL    Nitrite Urine, Quantitative NEGATIVE NEGATIVE    Leukocyte Esterase, Urine NEGATIVE NEGATIVE    RBC, UA 6 (H) 0 - 3 /HPF    WBC, UA 1 0 - 2 /HPF    Bacteria, UA NEGATIVE NEGATIVE /HPF    Mucus, UA RARE (A) NEGATIVE HPF    Trichomonas, UA NONE SEEN NONE SEEN /HPF   Blood Gas, Venous   Result Value Ref Range    pH, Fer 7.44 (H) 7.32 - 7.42    pCO2, Fer 35 (L) 38 - 52 mmHG    pO2, Fer 25 (L) 28 - 48 mmHG    Base Exc, Mixed . 1  PLUS   0 - 1.2    HCO3, Venous 23.8 19 - 25 MMOL/L    O2 Sat, Fer 55.8 50 - 70 %    Comment VBG    Lactic Acid, Plasma   Result Value Ref Range    Lactate (HH) 0.4 - 2.0 mMOL/L     2.2  LACT CALLED TO ER, DR SLADE ON [id number] AT 1100 [facility]     Lactic Acid, Plasma   Result Value Ref Range    Lactate 1.3 0.4 - 2.0 mMOL/L   POC Blood Glucose   Result Value Ref Range    Glucose 94 mg/dL   POCT Glucose   Result Value Ref Range    POC Glucose 94 70 - 99 MG/DL   EKG 12 Lead   Result Value Ref Range    Ventricular Rate 56 BPM    Atrial Rate 56 BPM    P-R Interval 230 ms    QRS Duration 144 ms    Q-T Interval 450 ms    QTc Calculation (Bazett) 434 ms    P Axis 68 degrees    R Axis -58 degrees    T Axis 27 degrees    Diagnosis       Sinus bradycardia with 1st degree AV block  Left axis deviation  Right bundle branch block  Septal infarct (cited on or before 15-JUL-2017)  Abnormal ECG  When compared with ECG of 15-JUL-2017 09:42,  Vent.  rate has decreased BY  31 BPM  Confirmed by [facility] Isa FIERRO ([id number]) on 6/23/2019 11:26:22 AM        Radiographs (if obtained):  [] The following radiograph was interpreted by myself in the absence of a radiologist:  [x]Radiologist's Report Reviewed:  CT Head WO Contrast   Final Result No acute intracranial abnormality. Chronic microvascular ischemic changes. Remote lacunar infarctions bilateral basal ganglia. XR CHEST PORTABLE   Final Result   Mild pulmonary vascular congestion. EKG (if obtained): (All EKG's are interpreted by myself in the absence of a cardiologist)  Sinus bradycardia with a first-degree AV block. Rate of 56. GA interval 230, , QTc 434. Nonspecific ST segments and T waves. Left axis deviation. Right bundle branch block. Q waves in the septal leads. Impression: Abnormal EKG. When compared to previous EKG from 7/15/2017, the bradycardia is new, otherwise no significant changes. MDM:  Differential diagnoses considered include delirium, dementia, acute intracranial hemorrhage, ischemic stroke, electrolyte abnormality, hypoglycemia, urinary tract infection. Patient has no focal neurologic deficits upon arrival and only appears confused. Due to this a stroke alert was not called. POC glucose is normal.  Basic labs were obtained and are unremarkable. His lactic acid level is slightly elevated started on IV fluids. Patient's INR is elevated. He is on Coumadin. CT scan of his head shows no acute intracranial abnormalities. Chest x-ray shows mild pulmonary vascular congestion but no pneumonia or pneumothorax. Patient is maintaining normal oxygen saturations in the emergency department and no longer appears short of breath. Urine not concerning for an infection. Patient is more aware but is still confused when answering certain questions. Plan for admission to the hospital for further evaluation and treatment. I spoke with Dr. Adilene Forde, who graciously agreed to admit the patient. Plan of care explained to patient. All questions and concerns were addressed to the patient's satisfaction. Patient understood and agreed with plan. Clinical Impression:  1. Altered mental status, unspecified altered mental status type    2. Elevated lactic acid level    3. Elevated INR          Jarvis Martinez MD       Please note that portions of this note may have been complete with a voice recognition program.  Effortswere made to edit the dictations, but occasional words are mis-transcribed.           Jarvis Martinez MD  06/23/19 9252

## 2019-06-23 NOTE — ED NOTES
Bed: ED-27  Expected date:   Expected time:   Means of arrival:   Comments:  NICHOLE Beltran  06/23/19 1016

## 2019-06-23 NOTE — H&P
Wil Chiu MD, [address]  Internal Medicine Hospitalist  History and Physical    Patient:  Author Dao  MRN: [id number]    Date of Service: Pt seen/examined on 6/23/2019 and Admitted to Inpatient status. CHIEF COMPLAINT:    Confusion and unable to walk    History Obtained From:    patient  PCP: Michelle Mai MD    HISTORY OF PRESENT ILLNESS:   The patient is a 70 y.o. male who presents with confusion this am when he woke up and unable to ambulate. He says that he was able to stand up but nothing else he could do. He lives with his wife who thought that he was confused and as he has had a hx of CVA in the past they thought that he was having one and squad was called and he was then transported to the ER. He now feels better and thinks that he is back to his base line. His wife is not with him at present in the ER but did come over. Past Medical History:        Diagnosis Date    ADHD (attention deficit hyperactivity disorder)     BPH (benign prostatic hyperplasia)     CAD (coronary artery disease)     Cerebral artery occlusion with cerebral infarction (HCC)     Hyperlipidemia     Kidney stone     MI (myocardial infarction) ([facility] [address]        Past Surgical History:        Procedure Laterality Date    CARDIAC SURGERY      CORONARY ANGIOPLASTY WITH STENT PLACEMENT      x5    HERNIA REPAIR      PROSTATE SURGERY      TURP  8/2010       Medications Prior to Admission:    Prior to Admission medications    Medication Sig Start Date End Date Taking?  Authorizing Provider   warfarin (COUMADIN) 2.5 MG tablet Take 2 tablets by mouth daily 12/4/17   Biju H Saeangibranden   lisinopril (PRINIVIL;ZESTRIL) 10 MG tablet Take 1 tablet by mouth daily 7/28/17   JOSÉ LUIS Mcmanus MD   rOPINIRole (REQUIP) 1 MG tablet Take 1 tablet by mouth 2 times daily 7/28/17   C Nohemi Mcmanus MD   carvedilol (COREG) 3.125 MG tablet Take 1 tablet by mouth 2 times daily (with meals) 7/28/17   C Nohemi Mcmanus MD   folic acid-pyridoxine-cyancobalamin (FOLTX) 1.13-25-2 MG TABS Take 1 tablet by mouth daily 7/28/17   C Alda Conklin MD   dipyridamole-aspirin (AGGRENOX)  MG per extended release capsule Take 1 capsule by mouth 2 times daily 7/28/17   C Alda Conklin MD   donepezil (ARICEPT) 10 MG tablet Take 1 tablet by mouth nightly 7/28/17   C Alda Conklin MD   FLUoxetine (PROZAC) 20 MG capsule Take 40 mg by mouth daily    Historical Provider, MD   tamsulosin (FLOMAX) 0.4 MG capsule Take 0.4 mg by mouth daily  2/26/15   Historical Provider, MD   amLODIPine (NORVASC) 5 MG tablet Take 5 mg by mouth daily. Historical Provider, MD   simvastatin (ZOCOR) 80 MG tablet Take 40 mg by mouth nightly     Historical Provider, MD   nitroGLYCERIN (NITROSTAT) 0.4 MG SL tablet Place 0.4 mg under the tongue every 5 minutes as needed. Historical Provider, MD       Allergies:  Patient has no known allergies. Social History:   TOBACCO:   reports that he has never smoked. He has never used smokeless tobacco.  ETOH:   reports that he does not drink alcohol. OCCUPATION:      Family History:   History reviewed. No pertinent family history. REVIEW OF SYSTEMS: (POSITIVES WILL BE UNDERLINED)  CONSTITUTIONAL:    recent weight changes, fatigue,  Fever, Chills, night sweats  EYES:     pain, tearing,  itching  acute change in vision  EARS:     hearing loss,  tinnitus,  vertigo,  discharge,  earache. NOSE:     Rhinorrhea,  sneezing,  itching,  allergy,  epistaxis  MOUTH/THROAT:    bleeding gums,  hoarseness,  sore throat. RESPIRATORY:      SOB, wheeze, cough, sputum, hemoptysis, bronchitis. CARDIOVASCULAR      chest pain, palpitations, dyspnea  exertion, orthopnea,  paroxysmal nocturnal dyspnea, pedal edema. GASTROINTESTINAL:      appetite changes,  nausea,  vomiting, indigestion,  dysphagia,  change in bowel movements,  abdominal pain.   GENITOURINARY:    Urinary frequency,  hesitancy ,  urgency,  polyuria,  dysuria,  hematuria, no nocturia, no incontinence. HEMATOLOGIC/LYMPHATIC:     Anemia,  bleeding tendencies. MUSCULOSKELETAL:      myalgias,  bone pain,  joint pain,  stiffness,   change in gait. NEUROLOGICAL:     Loss of Consciousness,  memory loss,   periods of confusion,  nervousness,  insomina,  aphasia,  Dysarthria. Unable to ambulate. SKIN :    Skin changes,  hair changes,  rashes,  sores. PSYCHIATRIC:  depression,  personality changes,  anxiety. ENDOCRINE:    polydipsia,  polyuria,  abnormal weight changes,  heat or cold intolerance. ALL/IMM :   reactions to drugs other than listed,  food allergy,  insect bites,  skin rash,   trouble breathing,  local or general lymph node enlargement, tenderness in axillae. Physical Exam:    Vitals: BP (!) 155/72   Pulse 50   Temp 97.5 °F (36.4 °C) (Oral)   Resp 20   Ht 6' 1\" (1.854 m)   Wt 183 lb (83 kg)   SpO2 98%   BMI 24.14 kg/m²   General appearance: alert, appears stated age and cooperative  Skin: Skin color, texture, turgor normal. No rashes or lesions  HEENT: Head: Normal, normocephalic, atraumatic. Eye: Normal external eye, conjunctiva, lids cornea, AZALEA. Neck / Thyroid: Supple, no masses, nodes, nodules or enlargement. Neck: no adenopathy, no carotid bruit, no JVD, supple, symmetrical, trachea midline and thyroid not enlarged, symmetric, no tenderness/mass/nodules  Lungs: clear to auscultation bilaterally  Heart: regular rate and rhythm, S1, S2 normal, no murmur, click, rub or gallop  Abdomen: soft, non-tender; bowel sounds normal; no masses,  no organomegaly  Extremities: extremities normal, atraumatic, no cyanosis or edema  Neurologic: Mental status: has some confusion but can answer most questions good. Cranial nerves 2-12 grossly intact. Strength 5/5 all 4 extremities. Normal light touch sensations. Reflexes +1-2 knees and biceps. Gait not checked.     Labs:  CBC with Differential:    Lab Results   Component Value Date    WBC 6.0 06/23/2019    RBC 4.74 06/23/2019    HGB 14.6 06/23/2019    HCT 44.0 06/23/2019     06/23/2019    MCV 92.8 06/23/2019    MCH 30.8 06/23/2019    MCHC 33.2 06/23/2019    RDW 12.0 06/23/2019    SEGSPCT 63.2 06/23/2019    LYMPHOPCT 27.1 06/23/2019    MONOPCT 7.9 06/23/2019    BASOPCT 0.5 06/23/2019    MONOSABS 0.5 06/23/2019    LYMPHSABS 1.6 06/23/2019    EOSABS 0.1 06/23/2019    BASOSABS 0.0 06/23/2019    DIFFTYPE AUTOMATED DIFFERENTIAL 06/23/2019     CMP:    Lab Results   Component Value Date     06/23/2019    K 4.4 06/23/2019     06/23/2019    CO2 23 06/23/2019    BUN 23 06/23/2019    CREATININE 1.0 06/23/2019    GFRAA >60 06/23/2019    LABGLOM >60 06/23/2019    GLUCOSE 94 06/23/2019    PROT 6.8 06/23/2019    LABALBU 3.9 06/23/2019    CALCIUM 9.6 06/23/2019    BILITOT 0.4 06/23/2019    ALKPHOS 96 06/23/2019    AST 40 06/23/2019    ALT 58 06/23/2019     -----------------------------------------------------------------  CT Head WO Contrast [[id number]] Collected: 06/23/19 1149      Order Status: Completed Updated: 06/23/19 1154     Narrative:       EXAMINATION:  CT OF THE HEAD WITHOUT CONTRAST  6/23/2019 10:29 am    TECHNIQUE:  CT of the head was performed without the administration of intravenous  contrast. Dose modulation, iterative reconstruction, and/or weight based  adjustment of the mA/kV was utilized to reduce the radiation dose to as low  as reasonably achievable. COMPARISON:  None. HISTORY:  ORDERING SYSTEM PROVIDED HISTORY: confusion  TECHNOLOGIST PROVIDED HISTORY:  Has a \"code stroke\" or \"stroke alert\" been called? ->No    FINDINGS:  BRAIN/VENTRICLES: There is no acute intracranial hemorrhage, mass effect or  midline shift. No abnormal extra-axial fluid collection. Lucencies within  the periventricular and subcortical white matter likely represent chronic  microvascular ischemic changes. Remote infarctions bilateral basal ganglia.   The gray-white differentiation is maintained without evidence of an acute  infarct. There is no evidence of hydrocephalus. ORBITS: The visualized portion of the orbits demonstrate no acute abnormality. SINUSES: The visualized paranasal sinuses and mastoid air cells demonstrate  no acute abnormality. SOFT TISSUES/SKULL:  No acute abnormality of the visualized skull or soft  tissues. Impression:       No acute intracranial abnormality. Chronic microvascular ischemic changes. Remote lacunar infarctions bilateral basal ganglia. XR CHEST PORTABLE [[id number]] Collected: 06/23/19 1044     Order Status: Completed Updated: 06/23/19 1047     Narrative:       EXAMINATION:  ONE XRAY VIEW OF THE CHEST    6/23/2019 10:33 am    COMPARISON:  July 15, 2017    HISTORY:  ORDERING SYSTEM PROVIDED HISTORY: shortness of breath  TECHNOLOGIST PROVIDED HISTORY:  Reason for exam:->shortness of breath  Ordering Physician Provided Reason for Exam: SOB  Acuity: Unknown  Type of Exam: Unknown    FINDINGS:  The cardiomediastinal silhouette is stable. There is mild pulmonary vascular  congestion. There are calcified granulomas. There is no pleural effusion. There is no pneumothorax. There is no acute osseous abnormality. Impression:       Mild pulmonary vascular congestion. Assessment and Plan     Patient Active Problem List   Diagnosis Code    VERONIKA on CPAP G47.33, Z99.89    PLMD (periodic limb movement disorder) G47.61    Left-sided weakness R53.1    Essential hypertension I10    TIA (transient ischemic attack) G45.9    CAD (coronary artery disease) I25.10    Hyperglycemia R73.9    Cerebral embolism with cerebral infarction ([facility] [address] I63.40    Ataxia due to recent stroke I69.393    Impaired cognition R41.89    Acute encephalopathy G93.40         Plan:    Problems being addressed this admission:    Acute Encephalopathy / TIA   6/23/19- admit , will have fall and seizure precautions. Will get an MRI and neurology consult. Carotis echo and heart echo as well.  On aggrenox and Coumadin. Hold coumadin as INR is high and need to be monitored. Check UDS. It does seem like he had a similar problem a few years ago when he had a CVA. HTN  6/23/19-continue with his home meds. MDD / Dementia / ADHD  6/23/19- continue his home medications. RLS  6/23/19- continue with requip. CAD  6/23/19- will rule out an MI as well, and continue with his cardiac meds, his HR is low but he is on BB. BPH  6/23/19- continue with his Tamsulosin    Basic Admit Orders: Will admit to a telemetry floor. Rule out for an AMI with serial Troponin's. DVT prophylaxis with heparin/lovenox and SCD's. Old records have been reviewed if available on line. I have discussed with the patient and the family the treatment plan. Expressed understanding. Patient is a full code. Nuris Bowser MD, FACP    Thank you Rajni Amaral MD for the opportunity to be involved in this patient's care. If you have any questions or concerns please feel free to contact me at [phone].

## 2019-06-23 NOTE — PROGRESS NOTES
Patient's niece, Felicity Gilford & sister-in-law, Candelario Huston have concerns about medications. They state that the patient's wife over medicates herself with her own medications & pt's wife is giving the patient her medications as well to help the patient relax. They state they have contacted [facility] & their info is in the emergency contacts. They do not want the wife to know that they are reporting this to us but they are concerned about the patient's well-being. This nurse asked the patient's wife if she is giving the patient her medications & she stated she was not. This nurse sent the patient's home medications to the pharmacy. The patient's wife is staying with the patient & has her home medications with her. This nurse explained to the patient's wife that she can not give the patient any medications & all of his medications must be given by his nurse. The wife expressed understanding.

## 2019-06-23 NOTE — ED TRIAGE NOTES
Pt to ED with complaints of AMS since this morning.  Per medics, pt's family reports pt went to bed last night and was normal.

## 2019-06-23 NOTE — CONSULTS
PHARMACY TO DOSE WARFARIN PER DR. Allison Evans    HISTORY:  INDICATION = Hx of TIA & ischemic stroke infarction & hx of Hyperhomocysteiemia  GOAL INR = 2-3     HOME DOSE = 2.5mg daily  DAILY INR ORDERED? Yes    OBJECTIVE:  AGE = 70 y.o.  SEX = male  HEIGHT = 6' 1\" (1.854 m)  Wt Readings from Last 3 Encounters:   06/23/19 183 lb (83 kg)   05/20/19 183 lb (83 kg)   02/18/19 196 lb (88.9 kg)     Recent Labs     06/23/19  1010   INR 5.75*   HGB 14.6   HCT 44.0          ASSESSMENT:  BLEEDING RISKS = Advanced age, labile INR, Hx HTN  SIGNS / SYMPTOMS OF BLEEDING = Non noted  POTENTIAL DRUG INTERACTIONS = No new drug-drug interactions  DIET = NPO  BRIDGE THERAPY = None    PLAN:  1). Hold warfarin due to supra-therapeutic INR. Consider resuming a reduced home regimen dose once INR trends down near 3.  2). Monitor INR & drug-drug interactions daily and adjust warfarin dose accordingly to achieve a goal INR between 2-3    Thank you for the consult & the opportunity to serve you and your patient.   Suad Juares RPh, PharmD, BCPS  6/23/2019   3:46 PM  _______________________________________________\

## 2019-06-23 NOTE — CONSULTS
acid-pyridoxine-cyancobalamin (FOLTX) 1.13-25-2 MG TABS 1 tablet, 1 tablet, Oral, Dailylisinopril (PRINIVIL;ZESTRIL) tablet 10 mg, 10 mg, Oral, DailynitroGLYCERIN (NITROSTAT) SL tablet 0.4 mg, 0.4 mg, Sublingual, Q5 Min PRNrOPINIRole (REQUIP) tablet 1 mg, 1 mg, Oral, BIDsimvastatin (ZOCOR) tablet 40 mg, 40 mg, Oral, Nightlysodium chloride flush 0.9 % injection 10 mL, 10 mL, Intravenous, 2 times per day sodium chloride flush 0.9 % injection 10 mL, 10 mL, Intravenous, PRNmagnesium hydroxide (MILK OF MAGNESIA) 400 MG/5ML suspension 30 mL, 30 mL, Oral, Daily PRNondansetron (ZOFRAN) injection 4 mg, 4 mg, Intravenous, Q6H PRNacetaminophen (TYLENOL) tablet 650 mg, 650 mg, Oral, Q4H PRNdextrose 5 % and 0.45 % sodium chloride infusion, , Intravenous, Continuoustamsulosin (FLOMAX) capsule 0.4 mg, 0.4 mg, Oral, Dailywarfarin (COUMADIN) daily dosing (placeholder), , Other, RX Placeholder  Allergies:  Patient has no known allergies. Social History:  TOBACCO:   reports that he has never smoked. He has never used smokeless tobacco.  ETOH:   reports that he does not drink alcohol. DRUGS:   reports that he does not use drugs. Family History:   History reviewed. No pertinent family history. REVIEW OF SYSTEMS:  CONSTITUTIONAL:  negative  HEENT:  negative  RESPIRATORY:  negative  CARDIOVASCULAR:  negative  GASTROINTESTINAL:  negative  GENITOURINARY:  negative  MUSCULOSKELETAL:  negative  BEHAVIOR/PSYCH:  Negative    ROS neg    Family hx neg    PHYSICAL EXAM  ------------------------  Vitals:  BP (!) 158/86   Pulse 51   Temp 97.9 °F (36.6 °C) (Oral)   Resp 14   Ht 6' 1\" (1.854 m)   Wt 175 lb 4 oz (79.5 kg)   SpO2 98%   BMI 23.12 kg/m²      General:  Awake, alert, oriented X 3. Well developed, well nourished, well groomed. No apparent distress. HEENT:  Normocephalic, atraumatic. Pupils equal, round, reactive to light. No scleral icterus. No conjunctival injection. Normal lips, teeth, and gums.   No nasal 06/23/2019                 BILITOT                  0.4                 06/23/2019                 ALKPHOS                  96                  06/23/2019                 AST                      40                  06/23/2019                 ALT                      58                  06/23/2019            BMP:  Lab Results       Component                Value               Date                       NA                       140                 06/23/2019                 K                        4.4                 06/23/2019                 CL                       107                 06/23/2019                 CO2                      23                  06/23/2019                 BUN                      23                  06/23/2019                 LABALBU                  3.9                 06/23/2019                 CREATININE               1.0                 06/23/2019                 CALCIUM                  9.6                 06/23/2019                 GFRAA                    >60                 06/23/2019                 LABGLOM                  >60                 06/23/2019                 GLUCOSE                  94                  06/23/2019            PT/INR:  Lab Results       Component                Value               Date                       PROTIME                  65.6                06/23/2019                 PROTIME                  30.1                12/21/2017                 PROTIME                  30.1                12/21/2017                 INR                      5.75                06/23/2019            PTT:  Lab Results       Component                Value               Date                       APTT                     29.1                05/23/2015          (APTT  U/A:  Lab Results       Component                Value               Date                       COLORU                   YELLOW              06/23/2019                 WBCUA                    1 06/23/2019                 RBCUA                    6                   06/23/2019                 MUCUS                    RARE                06/23/2019                 TRICHOMONAS              NONE SEEN           06/23/2019                 BACTERIA                 NEGATIVE            06/23/2019                 CLARITYU                 CLEAR               06/23/2019                 SPECGRAV                 1.018               06/23/2019                 LEUKOCYTESUR             NEGATIVE            06/23/2019                 UROBILINOGEN             NORMAL              06/23/2019                 BILIRUBINUR              NEGATIVE            06/23/2019                 BLOODU                   SMALL               06/23/2019            TSH:  No results found for: TSH  VITAMIN B12: No components found for: B12  FOLATE:  Lab Results       Component                Value               Date                       FOLATE                   >20.0               07/16/2017            RPR:  No results found for: RPR  PAULY:  Lab Results       Component                Value               Date                       PAULY                      None Detected       04/11/2014            Urine Toxicology:  No components found for: IAMMENTA, IBARBIT, IBENZO, ICOCAINE, IMARTHC, IOPIATES, IPHENCYC     IMPRESSION:    Transient Global amnesia-Metabolic encephalopathy -elevated LFT's check ammonia to r/o hepatic encephalopathy    R/o acute CVA    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 Sophia Bhatia MD  BOARD CERTIFIED-NEUROLOGY.

## 2019-06-24 ENCOUNTER — APPOINTMENT (OUTPATIENT)
Dept: MRI IMAGING | Age: 79
End: 2019-06-24
Payer: MEDICARE

## 2019-06-24 LAB
AMMONIA: 26 UMOL/L (ref 16–60)
ANION GAP SERPL CALCULATED.3IONS-SCNC: 10 MMOL/L (ref 4–16)
BASOPHILS ABSOLUTE: 0.1 K/CU MM
BASOPHILS RELATIVE PERCENT: 0.8 % (ref 0–1)
BUN BLDV-MCNC: 17 MG/DL (ref 6–23)
CALCIUM SERPL-MCNC: 8.4 MG/DL (ref 8.3–10.6)
CHLORIDE BLD-SCNC: 106 MMOL/L (ref 99–110)
CO2: 23 MMOL/L (ref 21–32)
CREAT SERPL-MCNC: 0.8 MG/DL (ref 0.9–1.3)
CULTURE: NORMAL
DIFFERENTIAL TYPE: ABNORMAL
EOSINOPHILS ABSOLUTE: 0.1 K/CU MM
EOSINOPHILS RELATIVE PERCENT: 1.6 % (ref 0–3)
FOLATE: >20 NG/ML (ref 3.1–17.5)
GFR AFRICAN AMERICAN: >60 ML/MIN/1.73M2
GFR NON-AFRICAN AMERICAN: >60 ML/MIN/1.73M2
GLUCOSE BLD-MCNC: 106 MG/DL (ref 70–99)
HCT VFR BLD CALC: 39.1 % (ref 42–52)
HEMOGLOBIN: 12.9 GM/DL (ref 13.5–18)
IMMATURE NEUTROPHIL %: 0.3 % (ref 0–0.43)
INR BLD: 5.56 INDEX
LV EF: 53 %
LVEF MODALITY: NORMAL
LYMPHOCYTES ABSOLUTE: 1.8 K/CU MM
LYMPHOCYTES RELATIVE PERCENT: 27.8 % (ref 24–44)
Lab: NORMAL
MCH RBC QN AUTO: 30.9 PG (ref 27–31)
MCHC RBC AUTO-ENTMCNC: 33 % (ref 32–36)
MCV RBC AUTO: 93.5 FL (ref 78–100)
MONOCYTES ABSOLUTE: 0.6 K/CU MM
MONOCYTES RELATIVE PERCENT: 9.6 % (ref 0–4)
NUCLEATED RBC %: 0 %
PDW BLD-RTO: 12.1 % (ref 11.7–14.9)
PLATELET # BLD: 212 K/CU MM (ref 140–440)
PMV BLD AUTO: 9.5 FL (ref 7.5–11.1)
POTASSIUM SERPL-SCNC: 4 MMOL/L (ref 3.5–5.1)
PROTHROMBIN TIME: 63.4 SECONDS (ref 9.12–12.5)
RBC # BLD: 4.18 M/CU MM (ref 4.6–6.2)
SEGMENTED NEUTROPHILS ABSOLUTE COUNT: 3.9 K/CU MM
SEGMENTED NEUTROPHILS RELATIVE PERCENT: 59.9 % (ref 36–66)
SODIUM BLD-SCNC: 139 MMOL/L (ref 135–145)
SPECIMEN: NORMAL
TOTAL IMMATURE NEUTOROPHIL: 0.02 K/CU MM
TOTAL NUCLEATED RBC: 0 K/CU MM
TROPONIN T: <0.01 NG/ML
VITAMIN B-12: 412.4 PG/ML (ref 211–911)
WBC # BLD: 6.4 K/CU MM (ref 4–10.5)

## 2019-06-24 PROCEDURE — 94761 N-INVAS EAR/PLS OXIMETRY MLT: CPT

## 2019-06-24 PROCEDURE — 2580000003 HC RX 258: Performed by: INTERNAL MEDICINE

## 2019-06-24 PROCEDURE — 97166 OT EVAL MOD COMPLEX 45 MIN: CPT

## 2019-06-24 PROCEDURE — 82746 ASSAY OF FOLIC ACID SERUM: CPT

## 2019-06-24 PROCEDURE — 93306 TTE W/DOPPLER COMPLETE: CPT

## 2019-06-24 PROCEDURE — 82140 ASSAY OF AMMONIA: CPT

## 2019-06-24 PROCEDURE — 97116 GAIT TRAINING THERAPY: CPT

## 2019-06-24 PROCEDURE — 97530 THERAPEUTIC ACTIVITIES: CPT

## 2019-06-24 PROCEDURE — 85610 PROTHROMBIN TIME: CPT

## 2019-06-24 PROCEDURE — 85025 COMPLETE CBC W/AUTO DIFF WBC: CPT

## 2019-06-24 PROCEDURE — 1200000000 HC SEMI PRIVATE

## 2019-06-24 PROCEDURE — 70551 MRI BRAIN STEM W/O DYE: CPT

## 2019-06-24 PROCEDURE — 6370000000 HC RX 637 (ALT 250 FOR IP): Performed by: INTERNAL MEDICINE

## 2019-06-24 PROCEDURE — 82607 VITAMIN B-12: CPT

## 2019-06-24 PROCEDURE — 84484 ASSAY OF TROPONIN QUANT: CPT

## 2019-06-24 PROCEDURE — 80048 BASIC METABOLIC PNL TOTAL CA: CPT

## 2019-06-24 PROCEDURE — 97162 PT EVAL MOD COMPLEX 30 MIN: CPT

## 2019-06-24 PROCEDURE — 92610 EVALUATE SWALLOWING FUNCTION: CPT

## 2019-06-24 RX ADMIN — FLUOXETINE 40 MG: 20 CAPSULE ORAL at 09:49

## 2019-06-24 RX ADMIN — ACETAMINOPHEN 650 MG: 325 TABLET ORAL at 14:34

## 2019-06-24 RX ADMIN — CARVEDILOL 3.12 MG: 3.12 TABLET, FILM COATED ORAL at 09:49

## 2019-06-24 RX ADMIN — SODIUM CHLORIDE, PRESERVATIVE FREE 10 ML: 5 INJECTION INTRAVENOUS at 09:50

## 2019-06-24 RX ADMIN — ROPINIROLE HYDROCHLORIDE 1 MG: 1 TABLET, FILM COATED ORAL at 09:50

## 2019-06-24 RX ADMIN — DEXTROSE AND SODIUM CHLORIDE: 5; 450 INJECTION, SOLUTION INTRAVENOUS at 05:35

## 2019-06-24 RX ADMIN — SODIUM CHLORIDE, PRESERVATIVE FREE 10 ML: 5 INJECTION INTRAVENOUS at 21:49

## 2019-06-24 RX ADMIN — ASPIRIN AND EXTENDED-RELEASE DIPYRIDAMOLE 1 CAPSULE: 25; 200 CAPSULE ORAL at 09:49

## 2019-06-24 RX ADMIN — SIMVASTATIN 40 MG: 40 TABLET, FILM COATED ORAL at 21:49

## 2019-06-24 RX ADMIN — TAMSULOSIN HYDROCHLORIDE 0.4 MG: 0.4 CAPSULE ORAL at 09:49

## 2019-06-24 RX ADMIN — DONEPEZIL HYDROCHLORIDE 10 MG: 10 TABLET, FILM COATED ORAL at 21:49

## 2019-06-24 RX ADMIN — Medication 1 TABLET: at 09:49

## 2019-06-24 RX ADMIN — ASPIRIN AND EXTENDED-RELEASE DIPYRIDAMOLE 1 CAPSULE: 25; 200 CAPSULE ORAL at 21:49

## 2019-06-24 RX ADMIN — ROPINIROLE HYDROCHLORIDE 1 MG: 1 TABLET, FILM COATED ORAL at 21:49

## 2019-06-24 RX ADMIN — LISINOPRIL 10 MG: 10 TABLET ORAL at 09:49

## 2019-06-24 ASSESSMENT — PAIN DESCRIPTION - LOCATION: LOCATION: HEAD

## 2019-06-24 ASSESSMENT — PAIN SCALES - GENERAL
PAINLEVEL_OUTOF10: 3
PAINLEVEL_OUTOF10: 0

## 2019-06-24 ASSESSMENT — PAIN DESCRIPTION - PAIN TYPE: TYPE: ACUTE PAIN

## 2019-06-24 NOTE — PROGRESS NOTES
NEUROLOGY NOTE  DR. Junior Taylor MD.  -------------------------------------------------  Subjective:    Pt is doing better without much dizziness or any symptoms    Doing better. Denies any new symptoms. Denies headache nausea vomiting dizziness    Denies numbness weakness extremities    Denies blurring of vision double vision    Objective:    /65   Pulse 65   Temp 97.6 °F (36.4 °C) (Oral)   Resp 20   Ht 6' 1\" (1.854 m)   Wt 173 lb 6.4 oz (78.7 kg)   SpO2 97%   BMI 22.88 kg/m²   HEENT nl      Neuro exam    Alert Oriented  X 3  Follow simple commands  EOMI Pupils 3 mm shaka  5/5 all 4 extremities      RADIOLOGY  -----------------    Ct Head Wo Contrast    Result Date: 6/23/2019  EXAMINATION: CT OF THE HEAD WITHOUT CONTRAST  6/23/2019 10:29 am TECHNIQUE: CT of the head was performed without the administration of intravenous contrast. Dose modulation, iterative reconstruction, and/or weight based adjustment of the mA/kV was utilized to reduce the radiation dose to as low as reasonably achievable. COMPARISON: None. HISTORY: ORDERING SYSTEM PROVIDED HISTORY: confusion TECHNOLOGIST PROVIDED HISTORY: Has a \"code stroke\" or \"stroke alert\" been called? ->No FINDINGS: BRAIN/VENTRICLES: There is no acute intracranial hemorrhage, mass effect or midline shift. No abnormal extra-axial fluid collection. Lucencies within the periventricular and subcortical white matter likely represent chronic microvascular ischemic changes. Remote infarctions bilateral basal ganglia. The gray-white differentiation is maintained without evidence of an acute infarct. There is no evidence of hydrocephalus. ORBITS: The visualized portion of the orbits demonstrate no acute abnormality. SINUSES: The visualized paranasal sinuses and mastoid air cells demonstrate no acute abnormality. SOFT TISSUES/SKULL:  No acute abnormality of the visualized skull or soft tissues. No acute intracranial abnormality.  Chronic microvascular ischemic changes. Remote lacunar infarctions bilateral basal ganglia. Xr Chest Portable    Result Date: 6/23/2019  EXAMINATION: ONE XRAY VIEW OF THE CHEST 6/23/2019 10:33 am COMPARISON: July 15, 2017 HISTORY: ORDERING SYSTEM PROVIDED HISTORY: shortness of breath TECHNOLOGIST PROVIDED HISTORY: Reason for exam:->shortness of breath Ordering Physician Provided Reason for Exam: SOB Acuity: Unknown Type of Exam: Unknown FINDINGS: The cardiomediastinal silhouette is stable. There is mild pulmonary vascular congestion. There are calcified granulomas. There is no pleural effusion. There is no pneumothorax. There is no acute osseous abnormality. Mild pulmonary vascular congestion. Vl Dup Carotid Bilateral    Result Date: 6/23/2019  EXAMINATION: ULTRASOUND EVALUATION OF THE CAROTID ARTERIES 6/23/2019 COMPARISON: None. HISTORY: ORDERING SYSTEM PROVIDED HISTORY: cva ? TECHNOLOGIST PROVIDED HISTORY: Reason for exam:->cva ? Acuity: Acute Type of Exam: Initial FINDINGS: RIGHT: The right common carotid artery demonstrates peak systolic velocities of 91.8, 65.0 cm/sec in the proximal and distal segments respectively. The right internal carotid artery demonstrates the systolic velocities of 48.5, 78.8, 41.2 cm/sec in the proximal, mid and distal segments respectively. The external carotid artery is patent. The vertebral artery demonstrates normal antegrade flow. No evidence of focal atherosclerotic plaque. ICA/CCA ratio of 1.1. LEFT: The left common carotid artery demonstrates peak systolic velocities of 990.2, 103.2 cm/sec in the proximal and distal segments respectively. The left internal carotid artery demonstrates the systolic velocities of 67.4, 95.1, 95.1 cm/sec in the proximal, mid and distal segments respectively. The external carotid artery is patent. The vertebral artery demonstrates normal antegrade flow. There is mild focal atherosclerotic plaque. ICA/CCA ratio of 0.9.      The right internal carotid artery demonstrates 0-50% stenosis . The left internal carotid artery demonstrates 0-50% stenosis . Bilateral vertebral arteries are patent with flow in the normal direction. Mri Brain Wo Contrast    Result Date: 6/24/2019  EXAMINATION: MRI OF THE BRAIN WITHOUT CONTRAST  6/24/2019 8:37 am TECHNIQUE: Multiplanar multisequence MRI of the brain was performed without the administration of intravenous contrast. COMPARISON: MRI brain on July 15, 2017. CT head on 06/23/2019. HISTORY: ORDERING SYSTEM PROVIDED HISTORY: ABNORMAL GAIT (ATAXIA) TECHNOLOGIST PROVIDED HISTORY: Ordering Physician Provided Reason for Exam: episode of confusion and weakness FINDINGS: INTRACRANIAL STRUCTURES/VENTRICLES: There is no acute infarct. There is moderate amount of nonspecific T2 hyperintense white matter lesions bilaterally, which are most often attributed to chronic small vessel ischemic white matter disease. Chronic bilateral basal ganglia lacunar infarcts. Moderate prominence of ventricles and sulci is compatible with cerebral volume loss. No abnormal susceptibility artifact is identified. No evidence of sellar or suprasellar mass. No midline shift. No significant mass effect. ORBITS: The visualized portion of the orbits demonstrate no acute abnormality. SINUSES: The visualized paranasal sinuses and mastoid air cells are well aerated. BONES/SOFT TISSUES: No significant abnormalities. 1. No evidence of acute infarct, intracranial hemorrhage, or significant mass effect. 2. 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        LAB RESULTS  --------------------    Recent Results (from the past 24 hour(s))   Troponin    Collection Time: 06/24/19 12:10 AM   Result Value Ref Range    Troponin T <0.010 <0.01 NG/ML   CBC auto differential    Collection Time: 06/24/19 12:10 AM   Result Value Ref Range    WBC 6.4 4.0 - 10.5 K/CU MM    RBC 4.18 (L) 4.6 - 6.2 M/CU MM    Hemoglobin 12.9 (L) 13.5 - 18.0 GM/DL    Hematocrit 39.1 (L) 42 - 52 %    MCV 93.5 78 - 100 FL    MCH 30.9 27 - 31 PG    MCHC 33.0 32.0 - 36.0 %    RDW 12.1 11.7 - 14.9 %    Platelets 160 487 - 691 K/CU MM    MPV 9.5 7.5 - 11.1 FL    Differential Type AUTOMATED DIFFERENTIAL     Segs Relative 59.9 36 - 66 %    Lymphocytes % 27.8 24 - 44 %    Monocytes % 9.6 (H) 0 - 4 %    Eosinophils % 1.6 0 - 3 %    Basophils % 0.8 0 - 1 %    Segs Absolute 3.9 K/CU MM    Lymphocytes # 1.8 K/CU MM    Monocytes # 0.6 K/CU MM    Eosinophils # 0.1 K/CU MM    Basophils # 0.1 K/CU MM    Nucleated RBC % 0.0 %    Total Nucleated RBC 0.0 K/CU MM    Total Immature Neutrophil 0.02 K/CU MM    Immature Neutrophil % 0.3 0 - 0.43 %   Basic Metabolic Panel w/ Reflex to MG    Collection Time: 06/24/19 12:10 AM   Result Value Ref Range    Sodium 139 135 - 145 MMOL/L    Potassium 4.0 3.5 - 5.1 MMOL/L    Chloride 106 99 - 110 mMol/L    CO2 23 21 - 32 MMOL/L    Anion Gap 10 4 - 16    BUN 17 6 - 23 MG/DL    CREATININE 0.8 (L) 0.9 - 1.3 MG/DL    Glucose 106 (H) 70 - 99 MG/DL    Calcium 8.4 8.3 - 10.6 MG/DL    GFR Non-African American >60 >60 mL/min/1.73m2    GFR African American >60 >60 mL/min/1.73m2   Protime-INR    Collection Time: 06/24/19 12:10 AM   Result Value Ref Range    Protime 63.4 (H) 9.12 - 12.5 SECONDS    INR 5.56 (HH) INDEX   Ammonia    Collection Time: 06/24/19 12:10 AM   Result Value Ref Range    Ammonia 26 16 - 60 UMOL/L   Vitamin B12 & Folate    Collection Time: 06/24/19 12:10 AM   Result Value Ref Range    Vitamin B-12 412.4 211 - 911 pg/ml    Folate >20.0 (H) 3.1 - 17.5 NG/ML         Medical problems    Patient Active Problem List:     VERONIKA on CPAP     PLMD (periodic limb movement disorder)     Left-sided weakness     Essential hypertension     TIA (transient ischemic attack)     CAD (coronary artery disease)     Hyperglycemia     Cerebral embolism with cerebral infarction ([facility] [address]     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 Pt is doing better without dizziness.         Electronically signed by Maria Alejandra Samayoa MD on 6/24/2019 at 4:48 PM

## 2019-06-24 NOTE — PROGRESS NOTES
Won Carrion MD, [address]                Internal Medicine Hospitalist             Daily Progress  Note   Subjective:     Chief Complaint   Patient presents with    Altered Mental Status     Mr. Brenner Complains of nil, feels good. Objective:    /62   Pulse 79   Temp 97.6 °F (36.4 °C) (Oral)   Resp 21   Ht 6' 1\" (1.854 m)   Wt 173 lb 6.4 oz (78.7 kg)   SpO2 97%   BMI 22.88 kg/m²      Intake/Output Summary (Last 24 hours) at 6/24/2019 0754  Last data filed at 6/24/2019 9973  Gross per 24 hour   Intake 946 ml   Output 180 ml   Net 766 ml      Physical Exam:  Heart:  Regular rate and rhythm, normal S1 and S2 in all 4 auscultatory areas. No rubs  Murmurs or gallops heard. Lungs: Mostly clear to auscultation, decreased breath sounds at bases. No wheezes appreciated no crackles heard. Abdomen: Soft, non distended. Bowel sounds appreciated. No obvious liver or spleen enlargement. Non tender, no rebound noted. Extremities: Non tender, no swelling noted, strength 5/5 both legs. CNS: Grossly intact.     Labs:  CBC with Differential:    Lab Results   Component Value Date    WBC 6.4 06/24/2019    RBC 4.18 06/24/2019    HGB 12.9 06/24/2019    HCT 39.1 06/24/2019     06/24/2019    MCV 93.5 06/24/2019    MCH 30.9 06/24/2019    MCHC 33.0 06/24/2019    RDW 12.1 06/24/2019    SEGSPCT 59.9 06/24/2019    LYMPHOPCT 27.8 06/24/2019    MONOPCT 9.6 06/24/2019    BASOPCT 0.8 06/24/2019    MONOSABS 0.6 06/24/2019    LYMPHSABS 1.8 06/24/2019    EOSABS 0.1 06/24/2019    BASOSABS 0.1 06/24/2019    DIFFTYPE AUTOMATED DIFFERENTIAL 06/24/2019     BMP:    Lab Results   Component Value Date     06/24/2019    K 4.0 06/24/2019     06/24/2019    CO2 23 06/24/2019    BUN 17 06/24/2019    LABALBU 3.9 06/23/2019    CREATININE 0.8 06/24/2019    CALCIUM 8.4 06/24/2019    GFRAA >60 06/24/2019    LABGLOM >60 06/24/2019    GLUCOSE 106 06/24/2019     Recent Labs     06/23/19  1010 06/23/19  1627 06/24/19  0010 / Dementia / ADHD  6/23/19- continue his home medications.      RLS  6/23/19- continue with requip.     CAD  6/23/19- will rule out an MI as well, and continue with his cardiac meds, his HR is low but he is on BB.   6/24/19- he ruled out for an AMI.      BPH  6/23/19- continue with his Tamsulosin      Consultants:  Neurology    General Orders:  Repeat basic labs again in am.  I have explained to the patient and discussed with him/her the treatment plan. Wife by his bed side.    Paty Fritz MD, [address]

## 2019-06-24 NOTE — PROGRESS NOTES
NEUROLOGY NOTE  DR. Marielle French MD.  -------------------------------------------------  Subjective:    Pt is doing better without much confusion    Doing better. Denies any new symptoms. Denies headache nausea vomiting dizziness    Denies numbness weakness extremities    Denies blurring of vision double vision    Objective:    /75   Pulse 71   Temp 97.8 °F (36.6 °C) (Oral)   Resp 21   Ht 6' 1\" (1.854 m)   Wt 173 lb 6.4 oz (78.7 kg)   SpO2 96%   BMI 22.88 kg/m²   HEENT nl      Neuro exam    Alert Oriented  X 3  Follow simple commands  EOMI Pupils 3 mm shaka  5/5 all 4 extremities      RADIOLOGY  -----------------    Ct Head Wo Contrast    Result Date: 6/23/2019  EXAMINATION: CT OF THE HEAD WITHOUT CONTRAST  6/23/2019 10:29 am TECHNIQUE: CT of the head was performed without the administration of intravenous contrast. Dose modulation, iterative reconstruction, and/or weight based adjustment of the mA/kV was utilized to reduce the radiation dose to as low as reasonably achievable. COMPARISON: None. HISTORY: ORDERING SYSTEM PROVIDED HISTORY: confusion TECHNOLOGIST PROVIDED HISTORY: Has a \"code stroke\" or \"stroke alert\" been called? ->No FINDINGS: BRAIN/VENTRICLES: There is no acute intracranial hemorrhage, mass effect or midline shift. No abnormal extra-axial fluid collection. Lucencies within the periventricular and subcortical white matter likely represent chronic microvascular ischemic changes. Remote infarctions bilateral basal ganglia. The gray-white differentiation is maintained without evidence of an acute infarct. There is no evidence of hydrocephalus. ORBITS: The visualized portion of the orbits demonstrate no acute abnormality. SINUSES: The visualized paranasal sinuses and mastoid air cells demonstrate no acute abnormality. SOFT TISSUES/SKULL:  No acute abnormality of the visualized skull or soft tissues. No acute intracranial abnormality. Chronic microvascular ischemic changes. Remote lacunar infarctions bilateral basal ganglia. Xr Chest Portable    Result Date: 6/23/2019  EXAMINATION: ONE XRAY VIEW OF THE CHEST 6/23/2019 10:33 am COMPARISON: July 15, 2017 HISTORY: ORDERING SYSTEM PROVIDED HISTORY: shortness of breath TECHNOLOGIST PROVIDED HISTORY: Reason for exam:->shortness of breath Ordering Physician Provided Reason for Exam: SOB Acuity: Unknown Type of Exam: Unknown FINDINGS: The cardiomediastinal silhouette is stable. There is mild pulmonary vascular congestion. There are calcified granulomas. There is no pleural effusion. There is no pneumothorax. There is no acute osseous abnormality. Mild pulmonary vascular congestion. Vl Dup Carotid Bilateral    Result Date: 6/23/2019  EXAMINATION: ULTRASOUND EVALUATION OF THE CAROTID ARTERIES 6/23/2019 COMPARISON: None. HISTORY: ORDERING SYSTEM PROVIDED HISTORY: cva ? TECHNOLOGIST PROVIDED HISTORY: Reason for exam:->cva ? Acuity: Acute Type of Exam: Initial FINDINGS: RIGHT: The right common carotid artery demonstrates peak systolic velocities of 36.7, 65.0 cm/sec in the proximal and distal segments respectively. The right internal carotid artery demonstrates the systolic velocities of 81.8, 78.8, 41.2 cm/sec in the proximal, mid and distal segments respectively. The external carotid artery is patent. The vertebral artery demonstrates normal antegrade flow. No evidence of focal atherosclerotic plaque. ICA/CCA ratio of 1.1. LEFT: The left common carotid artery demonstrates peak systolic velocities of 619.8, 103.2 cm/sec in the proximal and distal segments respectively. The left internal carotid artery demonstrates the systolic velocities of 40.6, 95.1, 95.1 cm/sec in the proximal, mid and distal segments respectively. The external carotid artery is patent. The vertebral artery demonstrates normal antegrade flow. There is mild focal atherosclerotic plaque. ICA/CCA ratio of 0.9.      The right internal carotid artery demonstrates 0-50% stenosis . The left internal carotid artery demonstrates 0-50% stenosis . Bilateral vertebral arteries are patent with flow in the normal direction. Mri Brain Wo Contrast    Result Date: 6/24/2019  EXAMINATION: MRI OF THE BRAIN WITHOUT CONTRAST  6/24/2019 8:37 am TECHNIQUE: Multiplanar multisequence MRI of the brain was performed without the administration of intravenous contrast. COMPARISON: MRI brain on July 15, 2017. CT head on 06/23/2019. HISTORY: ORDERING SYSTEM PROVIDED HISTORY: ABNORMAL GAIT (ATAXIA) TECHNOLOGIST PROVIDED HISTORY: Ordering Physician Provided Reason for Exam: episode of confusion and weakness FINDINGS: INTRACRANIAL STRUCTURES/VENTRICLES: There is no acute infarct. There is moderate amount of nonspecific T2 hyperintense white matter lesions bilaterally, which are most often attributed to chronic small vessel ischemic white matter disease. Chronic bilateral basal ganglia lacunar infarcts. Moderate prominence of ventricles and sulci is compatible with cerebral volume loss. No abnormal susceptibility artifact is identified. No evidence of sellar or suprasellar mass. No midline shift. No significant mass effect. ORBITS: The visualized portion of the orbits demonstrate no acute abnormality. SINUSES: The visualized paranasal sinuses and mastoid air cells are well aerated. BONES/SOFT TISSUES: No significant abnormalities. 1. No evidence of acute infarct, intracranial hemorrhage, or significant mass effect. 2. 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        LAB RESULTS  --------------------    Recent Results (from the past 24 hour(s))   Lactic Acid, Plasma    Collection Time: 06/23/19  1:49 PM   Result Value Ref Range    Lactate 1.3 0.4 - 2.0 mMOL/L   Urinalysis, reflex to microscopic    Collection Time: 06/23/19  2:25 PM   Result Value Ref Range    Color, UA YELLOW YELLOW    Clarity, UA CLEAR CLEAR    Glucose, Urine NEGATIVE NEGATIVE MG/DL    Bilirubin Urine NEGATIVE NEGATIVE MG/DL    Ketones, Urine NEGATIVE NEGATIVE MG/DL    Specific Gravity, UA 1.018 1.001 - 1.035    Blood, Urine SMALL (A) NEGATIVE    pH, Urine 5.0 5.0 - 8.0    Protein, UA NEGATIVE NEGATIVE MG/DL    Urobilinogen, Urine NORMAL 0.2 - 1.0 MG/DL    Nitrite Urine, Quantitative NEGATIVE NEGATIVE    Leukocyte Esterase, Urine NEGATIVE NEGATIVE    RBC, UA 6 (H) 0 - 3 /HPF    WBC, UA 1 0 - 2 /HPF    Bacteria, UA NEGATIVE NEGATIVE /HPF    Mucus, UA RARE (A) NEGATIVE HPF    Trichomonas, UA NONE SEEN NONE SEEN /HPF   Urine Culture    Collection Time: 06/23/19  2:25 PM   Result Value Ref Range    Specimen URINE CLEAN CATCH     Special Requests NONE     Culture NO AEROBIC GROWTH AT 24 HOURS    Drug screen multi urine    Collection Time: 06/23/19  2:25 PM   Result Value Ref Range    Cannabinoid Scrn, Ur NEGATIVE NEGATIVE    Amphetamines NEGATIVE NEGATIVE    Cocaine Metabolite NEGATIVE NEGATIVE    Benzodiazepine Screen, Urine NEGATIVE NEGATIVE    Barbiturate Screen, Ur NEGATIVE NEGATIVE    Opiates, Urine NEGATIVE NEGATIVE    Phencyclidine, Urine NEGATIVE NEGATIVE    Oxycodone  NEGATIVE     NEGATIVE          THRESHOLD CONCENTRATIONS (mg/dL)  AMPHT               1000  GRETA,OPIA             300  BZO,BAR              200  PCP                   25  THC                   50  OXY                  100          IF POSITIVE, SPECIMEN WILL BE  DISCARDED AFTER 6 MONTHS. CALL LAB IF CONFIRMATION NEEDED. ALL NEGATIVE SPECIMENS WILL BE  DISCARDED AFTER ONE WEEK. * UNCONFIRMED POSITIVES MAY  NOT MEET FORENSIC REQUIREMENTS.              Troponin    Collection Time: 06/23/19  4:27 PM   Result Value Ref Range    Troponin T <0.010 <0.01 NG/ML   Troponin    Collection Time: 06/24/19 12:10 AM   Result Value Ref Range    Troponin T <0.010 <0.01 NG/ML   CBC auto differential    Collection Time: 06/24/19 12:10 AM   Result Value Ref Range    WBC 6.4 4.0 - 10.5 K/CU MM    RBC 4.18 (L) 4.6 - 6.2 M/CU MM    Hemoglobin 12.9 (L) 13.5 - 18.0 GM/DL    Hematocrit 39.1 (L) 42 - 52 %    MCV 93.5 78 - 100 FL    MCH 30.9 27 - 31 PG    MCHC 33.0 32.0 - 36.0 %    RDW 12.1 11.7 - 14.9 %    Platelets 070 893 - 299 K/CU MM    MPV 9.5 7.5 - 11.1 FL    Differential Type AUTOMATED DIFFERENTIAL     Segs Relative 59.9 36 - 66 %    Lymphocytes % 27.8 24 - 44 %    Monocytes % 9.6 (H) 0 - 4 %    Eosinophils % 1.6 0 - 3 %    Basophils % 0.8 0 - 1 %    Segs Absolute 3.9 K/CU MM    Lymphocytes # 1.8 K/CU MM    Monocytes # 0.6 K/CU MM    Eosinophils # 0.1 K/CU MM    Basophils # 0.1 K/CU MM    Nucleated RBC % 0.0 %    Total Nucleated RBC 0.0 K/CU MM    Total Immature Neutrophil 0.02 K/CU MM    Immature Neutrophil % 0.3 0 - 0.43 %   Basic Metabolic Panel w/ Reflex to MG    Collection Time: 06/24/19 12:10 AM   Result Value Ref Range    Sodium 139 135 - 145 MMOL/L    Potassium 4.0 3.5 - 5.1 MMOL/L    Chloride 106 99 - 110 mMol/L    CO2 23 21 - 32 MMOL/L    Anion Gap 10 4 - 16    BUN 17 6 - 23 MG/DL    CREATININE 0.8 (L) 0.9 - 1.3 MG/DL    Glucose 106 (H) 70 - 99 MG/DL    Calcium 8.4 8.3 - 10.6 MG/DL    GFR Non-African American >60 >60 mL/min/1.73m2    GFR African American >60 >60 mL/min/1.73m2   Protime-INR    Collection Time: 06/24/19 12:10 AM   Result Value Ref Range    Protime 63.4 (H) 9.12 - 12.5 SECONDS    INR 5.56 (HH) INDEX   Ammonia    Collection Time: 06/24/19 12:10 AM   Result Value Ref Range    Ammonia 26 16 - 60 UMOL/L   Vitamin B12 & Folate    Collection Time: 06/24/19 12:10 AM   Result Value Ref Range    Vitamin B-12 412.4 211 - 911 pg/ml    Folate >20.0 (H) 3.1 - 17.5 NG/ML         Medical problems    Patient Active Problem List:     VERONIKA on CPAP     PLMD (periodic limb movement disorder)     Left-sided weakness     Essential hypertension     TIA (transient ischemic attack)     CAD (coronary artery disease)     Hyperglycemia     Cerebral embolism with cerebral infarction ([facility] [address]     Ataxia due to recent stroke     Impaired cognition     Acute encephalopathy      ASSESSMENT:  ---------------------    Transient Global amnesia-Metabolic encephalopathy -elevated LFT's check ammonia to r/o hepatic encephalopathy     Neg for acute CVA     Old bilateral basal ganglia infarcts small vessel disease     PLAN:     CT brain as above     Mri brain neg     C doppler neg     Echo pend     B 12 folate TSH ammonia level nl     Continue asa 81 mg q d     Discussed dx prognosis meds side effects and above with pt and his wife and answered all questions.             Electronically signed by Emeterio Parker MD on 6/24/2019 at 12:14 PM

## 2019-06-24 NOTE — PROGRESS NOTES
reports she thinks all the recent stress of moving has affected pt's medical and mental status. Wife reports pt has had a lot of recent falls, but only a couple in past couple months. Just loss of balance w/falls. Objective   Vision: Within Functional Limits  Vision Exceptions: Wears glasses for reading  Hearing: Exceptions to [facility]  Hearing Exceptions: Hard of hearing/hearing concerns;Bilateral hearing aid    Orientation  Overall Orientation Status: Impaired  Orientation Level: Oriented to person;Oriented to place; Disoriented to time     Balance  Sitting Balance: Stand by assistance(light dynamic at EOb)  Standing Balance: Contact guard assistance(light dynamic)     Tone RUE  RUE Tone: Normotonic  Tone LUE  LUE Tone: Normotonic     Bed mobility  Supine to Sit: Stand by assistance  Sit to Supine: Stand by assistance  Scooting: Stand by assistance  Transfers  Stand Pivot Transfers: Contact guard assistance(from bed, toilet)  Sit to stand: Contact guard assistance(from bed, toilet)  Stand to sit: Contact guard assistance(to toilet, bed)  Transfer Comments: amb and all transfers completed w/o use of AD at CGA. Pt tends to ambulate/move quickly, which is his baseline per wife. See PT eval for additional details on gait. Cognition  Overall Cognitive Status: Exceptions  Arousal/Alertness: Appropriate responses to stimuli  Following Commands:  Follows one step commands consistently  Attention Span: Appears intact  Memory: Decreased recall of precautions;Decreased recall of recent events;Decreased short term memory;Decreased long term memory  Safety Judgement: Decreased awareness of need for assistance;Decreased awareness of need for safety  Problem Solving: Decreased awareness of errors  Insights: Decreased awareness of deficits  Initiation: Requires cues for some  Sequencing: Requires cues for some  Hx dementia at baseline as well ADHD        Sensation  Overall Sensation Status: WFL        LUE AROM (degrees)  LUE AROM : WFL  RUE AROM (degrees)  RUE AROM : WFL  LUE Strength  Gross LUE Strength: WFL  RUE Strength  Gross RUE Strength: WFL                   Plan   Plan  Times per week: 2x+  Times per day: Daily  Current Treatment Recommendations: Strengthening, ROM, Balance Training, Functional Mobility Training, Neuromuscular Re-education, Equipment Evaluation, Education, & procurement, Patient/Caregiver Education & Training, Self-Care / ADL, Endurance Training, Safety Education & Training, Cognitive/Perceptual Training, Cognitive Reorientation    G-Code     OutComes Score                                                  AM-PAC Score  AM-PAC 6 click short form for inpatient daily activity:   How much help from another person does the patient currently need. .. Unable  Dep A Lot  Max A A Lot   Mod A A Little  Min A A Little   CGA  SBA None   Mod I  Indep  Sup   1. Putting on and taking off regular lower body clothing? [] 1    [] 2   [] 2   [] 3   [x] 3   [] 4      2. Bathing (including washing, rinsing, drying)? [] 1   [] 2   [] 2 [] 3 [x] 3 [] 4   3. Toileting, which includes using toilet, bedpan, or urinal? [] 1    [] 2   [] 2   [] 3   [x] 3   [] 4     4. Putting on and taking off regular upper body clothing? [] 1   [] 2   [] 2   [] 3   [x] 3    [] 4      5. Taking care of personal grooming such as brushing teeth? [] 1   [] 2    [] 2 [] 3    [x] 3   [] 4      6. Eating meals? [] 1   [] 2   [] 2   [] 3   [] 3   [x] 4      Raw Score:  19   [24=0% impaired(CH), 23=1-19%(CI), 20-22=20-39%(CJ), 15-19=40-59%(CK), 10-14=60-79%(CL), 7-9=80-99%(CM), 6=100%(CN)]               Goals  Short term goals  Time Frame for Short term goals: until pt discharged from hosp or goals met  Short term goal 1: Pt will complete bed mobility at mod indep. Short term goal 2: Pt will complete transfers and amb at mod indep in room for ADL completion. Short term goal 3: Pt will complete shower process w/use of seat at s/u and sup.   Short term goal 4: Pt will complete toileting at mod indep. Short term goal 5: Pt will complete resistive UE ther ex at sup w/min vcues to prevent loss of strength and act tolerance.         Therapy Time   Individual Concurrent Group Co-treatment   Time In 1045         Time Out 1115         Minutes 30         Timed Code Treatment Minutes: 15 Minutes       Denise Miranda, OT/L

## 2019-06-24 NOTE — CONSULTS
Tyree Ferris is a 70 y.o. male on warfarin therapy for Hx of TIA and ischemic stroke infarction and Hx of hyperhomocysteinemia. Pharmacy consulted by Dr. Morris Jain for monitoring and adjustment of treatment. Indication for anticoagulation: Hx of TIA and ischemic stroke infarction, Hx of hyperhomocysteinemia  INR goal: 2-3  Warfarin dose prior to admission: 2.5mg daily  Daily INR ordered:  yes    Pertinent Laboratory Values   Recent Labs     06/23/19  1010 06/24/19  0010   INR 5.75* 5.56*   HGB 14.6 12.9*   HCT 44.0 39.1*    212       Assessment/Plan:  New Drug Interactions: none noted  INR remains very elevated @5.56 today  Will continue to hold warfarin for now and look to restart dosing onc ethe INR is back under 3  Pharmacy will continue to monitor and adjust warfarin therapy as indicated    Thank you for the consult. Geno RODRIGUEZ, BCPS  6/24/2019 12:07 PM

## 2019-06-24 NOTE — PROGRESS NOTES
Nurse asked if patient follow us in office  He does follows in office  Was just seen recently     Stress test and echo 2017=normal Ef and negative for ischemia   Thanks

## 2019-06-24 NOTE — PROGRESS NOTES
Physical Therapy  [facility] ACUTE CARE PHYSICAL THERAPY EVALUATION  Mike Ax, 1940, [id number], 6/24/2019    History  [place]:  The primary encounter diagnosis was Altered mental status, unspecified altered mental status type. Diagnoses of Elevated lactic acid level and Elevated INR were also pertinent to this visit. Patient  has a past medical history of ADHD (attention deficit hyperactivity disorder), BPH (benign prostatic hyperplasia), CAD (coronary artery disease), Cerebral artery occlusion with cerebral infarction ([address] Hyperlipidemia, Kidney stone, MI (myocardial infarction) ([facility] [address] and VERONIKA on CPAP. Patient  has a past surgical history that includes TURP (8/2010); Cardiac surgery; Coronary angioplasty with stent; hernia repair; and Prostate surgery. Subjective:  Patient states: \"I feel okay, no pain\"   Pain: denies   Communication with other providers:  Handoff to RN, co-eval with OT.    Restrictions: general precautions, fall risk, bed exit, portable tele, pulse ox, IV     Home Setup/Prior level of function  Social/Functional History  Lives With: Spouse  [place] [place]19[place] the day pt was admitted to hosp))  Home Layout: One level  Home Access: Level entry  Bathroom Shower/Tub: Walk-in shower  Bathroom Toilet: Handicap height  Bathroom Equipment: Built-in shower seat  Home Equipment: 4 wheeled walker, Cane, [employer], Rolling walker  ADL Assistance: Independent  Homemaking Assistance: Needs assistance(wife is primary; pt states he would A w/cleaning and vaccuuming; plans to go to [place] for some meals, wife can do light meal prep in kitchenette)  Ambulation Assistance: Independent  Transfer Assistance: Independent  Active : Yes(but wife reports they will be getting rid of their car and using transport at [facility])  Occupation: Retired  Type of occupation:   Leisure & Hobbies: woodworking, model airplanes (has won awards for air plane models)  Additional Comments: Wife reports she thinks all the recent stress of moving has affected pt's medical and mental status. Wife reports pt has had a lot of recent falls, but only a couple in past couple months. Just loss of balance w/falls. Examination of body systems (includes body structures/functions, activity/participation limitations):  · Observation:  Supine in bed upon arrival. Wife visiting, agreeable to work with PT/OT   · Vision:  Reading glasses   · Hearing:  bilat hearing aid  · Cardiopulmonary:  HR 68bpm, SpO2 97% on room air   · Orientation: oriented to person, place, disoriented to time     Musculoskeletal  · ROM R/L:  WFL BLES . · Strength R/L:  BLEs 5/5, good in function and endurance. · Denies numbness/tingling     Mobility/treatment:  · Rolling L/R:  SBA   · Supine to sit:  SBA from near flat bed surface  · Sit to supine: SBA to flat bed surface  · Transfers: sit><stand from bed/standard toilet CGA, step pivot without AD to each surface CGA   · Sitting balance:  SBA from EOB static and light dynamic   · Standing balance:  CGA performing self care at sink. · Gait: 300ft without AD CGA. Pt has 2-3 small deviations from path with head turns though no additional assist required from therapist for balance. Lack of coordination to LEs having inconsistent step width with some crossing of LE on turn. Fast pace, fair foot clearance and step length bilat. · Educated pt and wife on POC, role of PT, DME, discharge recommendation, home set up. VCs for balance, weight shift, posture. [facility] 6 Clicks Inpatient Mobility:  AM-PAC Inpatient Mobility Raw Score : 20    Safety: patient left in bed w/ alarm, call light within reach, RN notified, gait belt used. Assessment: Body structures, Functions, Activity limitations: Decreased functional mobility ;  Decreased endurance; Decreased safe awareness; Decreased balance; Decreased

## 2019-06-24 NOTE — CARE COORDINATION
LSW spoke with pt and wife regarding discharge plans. Pt and wife are closing on an apt at [facility] today at 1:00pm. PT/OT are recommending HC. LSW spoke with pt and wife about having HC to come in the apt to help with making sure pt is getting his meds and PT/OT. Pt and wife agree and stated they have had [facility] in the past and are good with HC. CM will need a inpt HC order at discharge. IF pt is discharged after hours please complete the following. Call [phone] and inform them of discharge. Fax HC order, H&P, discharge summary (if available) and AVS to [phone]. Referral called to [facility] with [address] [facility]. LSW informed of concerns that pt's wife is over medicating herself and giving pt wrong medications. LSW will inform HC of this issue and also spoke with Nile Carias with [facility] about family concerns.

## 2019-06-24 NOTE — PROGRESS NOTES
Education Response: Verbalizes understanding  Safety Devices in place: Yes       G-Code            Therapy Time  SLP Individual Minutes  Time In: 6840  Time Out: [address]  Minutes: [address], Claudine Gates, 6/24/2019

## 2019-06-25 VITALS
TEMPERATURE: 97.6 F | SYSTOLIC BLOOD PRESSURE: 118 MMHG | OXYGEN SATURATION: 94 % | WEIGHT: 179.5 LBS | HEART RATE: 77 BPM | RESPIRATION RATE: 15 BRPM | HEIGHT: 73 IN | BODY MASS INDEX: 23.79 KG/M2 | DIASTOLIC BLOOD PRESSURE: 74 MMHG

## 2019-06-25 PROBLEM — G93.40 ACUTE ENCEPHALOPATHY: Status: ACTIVE | Noted: 2019-06-25

## 2019-06-25 PROBLEM — G93.40 ACUTE ENCEPHALOPATHY: Status: RESOLVED | Noted: 2019-06-23 | Resolved: 2019-06-25

## 2019-06-25 PROBLEM — G93.40 ACUTE ENCEPHALOPATHY: Status: RESOLVED | Noted: 2019-06-25 | Resolved: 2019-06-25

## 2019-06-25 LAB
ANION GAP SERPL CALCULATED.3IONS-SCNC: 14 MMOL/L (ref 4–16)
BASOPHILS ABSOLUTE: 0 K/CU MM
BASOPHILS RELATIVE PERCENT: 0.7 % (ref 0–1)
BUN BLDV-MCNC: 14 MG/DL (ref 6–23)
CALCIUM SERPL-MCNC: 8.8 MG/DL (ref 8.3–10.6)
CHLORIDE BLD-SCNC: 106 MMOL/L (ref 99–110)
CO2: 21 MMOL/L (ref 21–32)
CREAT SERPL-MCNC: 0.9 MG/DL (ref 0.9–1.3)
DIFFERENTIAL TYPE: ABNORMAL
EOSINOPHILS ABSOLUTE: 0.1 K/CU MM
EOSINOPHILS RELATIVE PERCENT: 1.8 % (ref 0–3)
GFR AFRICAN AMERICAN: >60 ML/MIN/1.73M2
GFR NON-AFRICAN AMERICAN: >60 ML/MIN/1.73M2
GLUCOSE BLD-MCNC: 93 MG/DL (ref 70–99)
HCT VFR BLD CALC: 38.4 % (ref 42–52)
HEMOGLOBIN: 12.9 GM/DL (ref 13.5–18)
IMMATURE NEUTROPHIL %: 0.2 % (ref 0–0.43)
INR BLD: 3.14 INDEX
LYMPHOCYTES ABSOLUTE: 1.9 K/CU MM
LYMPHOCYTES RELATIVE PERCENT: 31.5 % (ref 24–44)
MAGNESIUM: 2.2 MG/DL (ref 1.8–2.4)
MCH RBC QN AUTO: 30.8 PG (ref 27–31)
MCHC RBC AUTO-ENTMCNC: 33.6 % (ref 32–36)
MCV RBC AUTO: 91.6 FL (ref 78–100)
MONOCYTES ABSOLUTE: 0.5 K/CU MM
MONOCYTES RELATIVE PERCENT: 8.9 % (ref 0–4)
NUCLEATED RBC %: 0 %
PDW BLD-RTO: 12.1 % (ref 11.7–14.9)
PHOSPHORUS: 3.5 MG/DL (ref 2.5–4.9)
PLATELET # BLD: 191 K/CU MM (ref 140–440)
PMV BLD AUTO: 9.2 FL (ref 7.5–11.1)
POTASSIUM SERPL-SCNC: 3.9 MMOL/L (ref 3.5–5.1)
PRO-BNP: 303 PG/ML
PROCALCITONIN: 0.04
PROTHROMBIN TIME: 36 SECONDS (ref 9.12–12.5)
RBC # BLD: 4.19 M/CU MM (ref 4.6–6.2)
SEGMENTED NEUTROPHILS ABSOLUTE COUNT: 3.4 K/CU MM
SEGMENTED NEUTROPHILS RELATIVE PERCENT: 56.9 % (ref 36–66)
SODIUM BLD-SCNC: 141 MMOL/L (ref 135–145)
TOTAL IMMATURE NEUTOROPHIL: 0.01 K/CU MM
TOTAL NUCLEATED RBC: 0 K/CU MM
WBC # BLD: 6 K/CU MM (ref 4–10.5)

## 2019-06-25 PROCEDURE — 85025 COMPLETE CBC W/AUTO DIFF WBC: CPT

## 2019-06-25 PROCEDURE — 94660 CPAP INITIATION&MGMT: CPT

## 2019-06-25 PROCEDURE — 80048 BASIC METABOLIC PNL TOTAL CA: CPT

## 2019-06-25 PROCEDURE — 6370000000 HC RX 637 (ALT 250 FOR IP): Performed by: INTERNAL MEDICINE

## 2019-06-25 PROCEDURE — 2580000003 HC RX 258: Performed by: INTERNAL MEDICINE

## 2019-06-25 PROCEDURE — 36415 COLL VENOUS BLD VENIPUNCTURE: CPT

## 2019-06-25 PROCEDURE — 85610 PROTHROMBIN TIME: CPT

## 2019-06-25 PROCEDURE — 84100 ASSAY OF PHOSPHORUS: CPT

## 2019-06-25 PROCEDURE — 83880 ASSAY OF NATRIURETIC PEPTIDE: CPT

## 2019-06-25 PROCEDURE — G0378 HOSPITAL OBSERVATION PER HR: HCPCS

## 2019-06-25 PROCEDURE — 83735 ASSAY OF MAGNESIUM: CPT

## 2019-06-25 PROCEDURE — 84145 PROCALCITONIN (PCT): CPT

## 2019-06-25 RX ADMIN — TAMSULOSIN HYDROCHLORIDE 0.4 MG: 0.4 CAPSULE ORAL at 09:29

## 2019-06-25 RX ADMIN — ACETAMINOPHEN 650 MG: 325 TABLET ORAL at 14:28

## 2019-06-25 RX ADMIN — CARVEDILOL 3.12 MG: 3.12 TABLET, FILM COATED ORAL at 09:29

## 2019-06-25 RX ADMIN — Medication 1 TABLET: at 09:29

## 2019-06-25 RX ADMIN — SODIUM CHLORIDE, PRESERVATIVE FREE 10 ML: 5 INJECTION INTRAVENOUS at 09:28

## 2019-06-25 RX ADMIN — ROPINIROLE HYDROCHLORIDE 1 MG: 1 TABLET, FILM COATED ORAL at 09:29

## 2019-06-25 RX ADMIN — FLUOXETINE 40 MG: 20 CAPSULE ORAL at 09:29

## 2019-06-25 RX ADMIN — LISINOPRIL 10 MG: 10 TABLET ORAL at 09:29

## 2019-06-25 RX ADMIN — ASPIRIN AND EXTENDED-RELEASE DIPYRIDAMOLE 1 CAPSULE: 25; 200 CAPSULE ORAL at 09:29

## 2019-06-25 ASSESSMENT — PAIN SCALES - GENERAL
PAINLEVEL_OUTOF10: 0
PAINLEVEL_OUTOF10: 6

## 2019-06-25 NOTE — DISCHARGE SUMMARY
Discharge Summary    Name:  Doris Hyde /Age/Sex: 1940  (70 y.o. male)   MRN & CSN:  [id number] & [id number] Admission Date/Time: 2019 10:10 AM   Attending:  Nuris Bowser MD Discharging Physician: Brianna Rodrigues MD     Hospital Course:   Doris Hyde is a 70 y.o.  male  who presents with acute encephalopathy. He had normal findings. It was likely had increased stress. He has no complaints at this time. No side effects,        The patient expressed appropriate understanding of and agreement with the discharge recommendations, medications, and plan. Consults this admission:  IP CONSULT TO HOSPITALIST  IP CONSULT TO PHARMACY  IP CONSULT TO NEUROLOGY  IP CONSULT TO CASE MANAGEMENT    Discharge Instruction:   Follow up appointments: none  Primary care physician:  within 2 weeks    Diet:  regular diet   Activity: activity as tolerated  Disposition: Discharged to:   [x]Home, [][facility], []SNF, []Acute Rehab, []Hospice   Condition on discharge: Stable    Discharge Medications:      Donavon English   [place] Medication Instructions DRISS    Printed on:19 0641   Medication Information                      amLODIPine (NORVASC) 5 MG tablet  Take 5 mg by mouth daily.              carvedilol (COREG) 3.125 MG tablet  Take 1 tablet by mouth 2 times daily (with meals)             dipyridamole-aspirin (AGGRENOX)  MG per extended release capsule  Take 1 capsule by mouth 2 times daily             donepezil (ARICEPT) 10 MG tablet  Take 1 tablet by mouth nightly             FLUoxetine (PROZAC) 20 MG capsule  Take 40 mg by mouth daily             folic acid-pyridoxine-cyancobalamin (FOLTX) 1.13-25-2 MG TABS  Take 1 tablet by mouth daily             lisinopril (PRINIVIL;ZESTRIL) 10 MG tablet  Take 1 tablet by mouth daily             mirtazapine (REMERON) 15 MG tablet  Take 15 mg by mouth nightly             nitroGLYCERIN (NITROSTAT) 0.4 MG SL tablet  Place 0.4 mg under the tongue every 5 minutes as needed. rOPINIRole (REQUIP) 1 MG tablet  Take 1 tablet by mouth 2 times daily             simvastatin (ZOCOR) 80 MG tablet  Take 40 mg by mouth nightly              tamsulosin (FLOMAX) 0.4 MG capsule  Take 0.4 mg by mouth 2 times daily              warfarin (COUMADIN) 2.5 MG tablet  Take 2 tablets by mouth daily                 Objective Findings at Discharge:   /74   Pulse 77   Temp 97.6 °F (36.4 °C) (Oral)   Resp 15   Ht 6' 1\" (1.854 m)   Wt 179 lb 8 oz (81.4 kg)   SpO2 94%   BMI 23.68 kg/m²            PHYSICAL EXAM   GEN Awake male, sitting upright in bed in no apparent distress. Appears given age. EYES Pupils are equally round. No scleral erythema, discharge, or conjunctivitis. HENT Mucous membranes are moist. Oral pharynx without exudates, no evidence of thrush. NECK Supple, no apparent thyromegaly or masses. RESP Clear to auscultation, no wheezes, rales or rhonchi. Symmetric chest movement while on room air. CARDIO/VASC S1/S2 auscultated. Regular rate without appreciable murmurs, rubs, or gallops. No JVD or carotid bruits. Peripheral pulses equal bilaterally and palpable. No peripheral edema. GI Abdomen is soft without significant tenderness, masses, or guarding. Bowel sounds are normoactive. Rectal exam deferred.  No costovertebral angle tenderness. Normal appearing external genitalia. Saunders catheter is not present. HEME/LYMPH No palpable cervical lymphadenopathy and no hepatosplenomegaly. No petechiae or ecchymoses. MSK No gross joint deformities. SKIN Normal coloration, warm, dry. NEURO Cranial nerves appear grossly intact, normal speech, no lateralizing weakness. PSYCH Awake, alert, oriented x 4. Affect appropriate.     BMP/CBC  Recent Labs     06/23/19  1010 06/24/19  0010 06/25/19  0242    139 141   K 4.4 4.0 3.9    106 106   CO2 23 23 21   BUN 23 17 14   CREATININE 1.0 0.8* 0.9   WBC 6.0 6.4 6.0   HCT 44.0 39.1* 38.4*    212 191 Discharge Time of 35 minutes    Electronically signed by Stew Cortes MD on 6/25/2019 at 2:37 PM

## 2019-06-25 NOTE — PROGRESS NOTES
[facility] Liaison spoke with pt yesterday. And is aware of his transition to [facility]. And also aware of discharge & will initiate Arlyn Hong.

## 2019-06-25 NOTE — CARE COORDINATION
Chart reviewed and patient appropriate for observation status only, attending physician notified and patient referred to Dr. Snow for code 44 status. Chloe Concepcion R.N.    Ext: [phone]

## 2019-06-25 NOTE — PROGRESS NOTES
Pulmonary and Critical Care  Progress Note    Subjective: The patient has improved. Used Cpap,slept great. Feeling fresh. Shortness of breath none  Chest pain none  Addressing respiratory complaints Patient is negative for  hemoptysis and cyanosis  CONSTITUTIONAL:  negative for fevers and chills      Past Medical History:     has a past medical history of ADHD (attention deficit hyperactivity disorder), BPH (benign prostatic hyperplasia), CAD (coronary artery disease), Cerebral artery occlusion with cerebral infarction ([address] Hyperlipidemia, Kidney stone, MI (myocardial infarction) ([address] and VERONIKA on CPAP. has a past surgical history that includes TURP (8/2010); Cardiac surgery; Coronary angioplasty with stent; hernia repair; and Prostate surgery. reports that he has never smoked. He has never used smokeless tobacco. He reports that he does not drink alcohol or use drugs. Family history:  family history is not on file. No Known Allergies  Social History:    Reviewed; no changes    Objective:   PHYSICAL EXAM:        VITALS:  /74   Pulse 77   Temp 97.6 °F (36.4 °C) (Oral)   Resp 15   Ht 6' 1\" (1.854 m)   Wt 179 lb 8 oz (81.4 kg)   SpO2 94%   BMI 23.68 kg/m²     24HR INTAKE/OUTPUT:      Intake/Output Summary (Last 24 hours) at 6/25/2019 1050  Last data filed at 6/25/2019 0523  Gross per 24 hour   Intake 10 ml   Output 650 ml   Net -640 ml       CONSTITUTIONAL:  awake, alert, cooperative, no apparent distress, and appears stated age  LUNGS:  decreased breath sounds  CARDIOVASCULAR:  normal S1 and S2 and negative JVD  ABD:Abdomen soft, non-tender. BS normal. No masses,  No organomegaly  NEURO:Alert and oriented x3. Gait normal. Reflexes and motor strength normal and symmetric. Cranial nerves 2-12 and sensation grossly intact.   DATA:    CBC:  Recent Labs     06/23/19  1010 06/24/19  0010 06/25/19  0242   WBC 6.0 6.4 6.0   RBC 4.74 4.18* 4.19*   HGB 14.6 12.9* 12.9*   HCT 44.0 39.1* 38.4*    212

## 2019-06-25 NOTE — CONSULTS
Robson Correa is a 70 y.o. male on warfarin therapy for Hx of TIA and ischemic stroke infarction and Hx of hyperhomocysteinemia. Pharmacy consulted by Dr. Rico Singer for monitoring and adjustment of treatment. Indication for anticoagulation: Hx of TIA and ischemic stroke infarction, Hx of hyperhomocysteinemia  INR goal: 2-3  Warfarin dose prior to admission: 2.5mg daily  Daily INR ordered:  yes    Pertinent Laboratory Values   Recent Labs     06/23/19  1010 06/24/19  0010 06/25/19  0242   INR 5.75* 5.56* 3.14   HGB 14.6 12.9* 12.9*   HCT 44.0 39.1* 38.4*    212 191       Assessment/Plan:   New Drug Interactions: none noted   INR remains slightly elevated @3.14 today   Will continue to hold warfarin for now and look to restart dosing once the INR is back under 3  [address] will continue to monitor and adjust warfarin therapy as indicated    Thank you for the consult. Ifeanyi Whitehead  6/25/2019 12:01 PM

## 2019-06-25 NOTE — CONSULTS
per minute. He is afebrile. His saturation  is 97% on room air. HEENT:  Essentially unremarkable. NECK:  There is no JVD. No lymphadenopathy. The neck is supple. LUNGS:  Clear to auscultation. HEART:  Showed normal S1 and S2. There was no S3 or S4 noted. ABDOMEN:  Benign. There is no evidence of any organomegaly. The bowel  sounds are present. NEUROLOGIC:  Reveals that he is awake and responsive, answering  questions appropriately. LABORATORY DATA:  His electrolytes were unremarkable. His CBC showed a  white count of 6.4, hemoglobin 12.9, and hematocrit 39.1. His INR was  elevated and was 5.56. His urine drug screen was negative. RADIOGRAPHIC DATA:  His chest x-ray showed mild pulmonary vascular  congestion. IMPRESSION:  1. Obstructive sleep apnea, on CPAP. 2.  Rule out CVA. PLAN:  1. We will start the patient on auto-CPAP. 2.  Check BNP level. 3.  Check procalcitonin level. 4.  Discussed with the patient and the family in detail. 5.  As per orders.         Kaden Blake MD    D: 06/24/2019 11:27:38       T: 06/25/2019 3:27:18     [email or web address]  Job#: [id number]     Doc#: [id number]    CC:

## 2019-06-26 NOTE — PROGRESS NOTES
The Utilization Review Committee members, including its physician members, have reviewed this case and agree that this patient does not meet evidence based criteria for inpatient services, requiring a change in patient status from \"admit as inpatient\" to \"place in observation\" in accordance with condition code 44. Medical care will continue to be provided by No att. providers found, who concurs with this decision and has documented his/her concurrence in the patient's medical record.

## 2019-06-26 NOTE — PROGRESS NOTES
NEUROLOGY NOTE  DR. Jeremiah Forman MD.  -------------------------------------------------  Subjective:    Pt is doing better without much dizziness or any symptoms    Doing better. Denies any new symptoms. Denies headache nausea vomiting dizziness    Denies numbness weakness extremities    Denies blurring of vision double vision    Objective:    /74   Pulse 77   Temp 97.6 °F (36.4 °C) (Oral)   Resp 15   Ht 6' 1\" (1.854 m)   Wt 179 lb 8 oz (81.4 kg)   SpO2 94%   BMI 23.68 kg/m²   HEENT nl      Neuro exam    Alert Oriented  X 3  Follow simple commands  EOMI Pupils 3 mm shaka  5/5 all 4 extremities      RADIOLOGY  -----------------    Ct Head Wo Contrast    Result Date: 6/23/2019  EXAMINATION: CT OF THE HEAD WITHOUT CONTRAST  6/23/2019 10:29 am TECHNIQUE: CT of the head was performed without the administration of intravenous contrast. Dose modulation, iterative reconstruction, and/or weight based adjustment of the mA/kV was utilized to reduce the radiation dose to as low as reasonably achievable. COMPARISON: None. HISTORY: ORDERING SYSTEM PROVIDED HISTORY: confusion TECHNOLOGIST PROVIDED HISTORY: Has a \"code stroke\" or \"stroke alert\" been called? ->No FINDINGS: BRAIN/VENTRICLES: There is no acute intracranial hemorrhage, mass effect or midline shift. No abnormal extra-axial fluid collection. Lucencies within the periventricular and subcortical white matter likely represent chronic microvascular ischemic changes. Remote infarctions bilateral basal ganglia. The gray-white differentiation is maintained without evidence of an acute infarct. There is no evidence of hydrocephalus. ORBITS: The visualized portion of the orbits demonstrate no acute abnormality. SINUSES: The visualized paranasal sinuses and mastoid air cells demonstrate no acute abnormality. SOFT TISSUES/SKULL:  No acute abnormality of the visualized skull or soft tissues. No acute intracranial abnormality.  Chronic microvascular ischemic demonstrates 0-50% stenosis . The left internal carotid artery demonstrates 0-50% stenosis . Bilateral vertebral arteries are patent with flow in the normal direction. Mri Brain Wo Contrast    Result Date: 6/24/2019  EXAMINATION: MRI OF THE BRAIN WITHOUT CONTRAST  6/24/2019 8:37 am TECHNIQUE: Multiplanar multisequence MRI of the brain was performed without the administration of intravenous contrast. COMPARISON: MRI brain on July 15, 2017. CT head on 06/23/2019. HISTORY: ORDERING SYSTEM PROVIDED HISTORY: ABNORMAL GAIT (ATAXIA) TECHNOLOGIST PROVIDED HISTORY: Ordering Physician Provided Reason for Exam: episode of confusion and weakness FINDINGS: INTRACRANIAL STRUCTURES/VENTRICLES: There is no acute infarct. There is moderate amount of nonspecific T2 hyperintense white matter lesions bilaterally, which are most often attributed to chronic small vessel ischemic white matter disease. Chronic bilateral basal ganglia lacunar infarcts. Moderate prominence of ventricles and sulci is compatible with cerebral volume loss. No abnormal susceptibility artifact is identified. No evidence of sellar or suprasellar mass. No midline shift. No significant mass effect. ORBITS: The visualized portion of the orbits demonstrate no acute abnormality. SINUSES: The visualized paranasal sinuses and mastoid air cells are well aerated. BONES/SOFT TISSUES: No significant abnormalities. 1. No evidence of acute infarct, intracranial hemorrhage, or significant mass effect. 2. 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        LAB RESULTS  --------------------    Recent Results (from the past 24 hour(s))   CBC auto differential    Collection Time: 06/25/19  2:42 AM   Result Value Ref Range    WBC 6.0 4.0 - 10.5 K/CU MM    RBC 4.19 (L) 4.6 - 6.2 M/CU MM    Hemoglobin 12.9 (L) 13.5 - 18.0 GM/DL    Hematocrit 38.4 (L) 42 - 52 %    MCV 91.6 78 - 100 FL    MCH 30.8 27 - 31 PG    MCHC 33.6 32.0 - 36.0 %    RDW 12.1 11.7 - 14.9 %    Platelets 540 267 - 344 K/CU MM    MPV 9.2 7.5 - 11.1 FL    Differential Type AUTOMATED DIFFERENTIAL     Segs Relative 56.9 36 - 66 %    Lymphocytes % 31.5 24 - 44 %    Monocytes % 8.9 (H) 0 - 4 %    Eosinophils % 1.8 0 - 3 %    Basophils % 0.7 0 - 1 %    Segs Absolute 3.4 K/CU MM    Lymphocytes # 1.9 K/CU MM    Monocytes # 0.5 K/CU MM    Eosinophils # 0.1 K/CU MM    Basophils # 0.0 K/CU MM    Nucleated RBC % 0.0 %    Total Nucleated RBC 0.0 K/CU MM    Total Immature Neutrophil 0.01 K/CU MM    Immature Neutrophil % 0.2 0 - 0.43 %   Basic Metabolic Panel w/ Reflex to MG    Collection Time: 06/25/19  2:42 AM   Result Value Ref Range    Sodium 141 135 - 145 MMOL/L    Potassium 3.9 3.5 - 5.1 MMOL/L    Chloride 106 99 - 110 mMol/L    CO2 21 21 - 32 MMOL/L    Anion Gap 14 4 - 16    BUN 14 6 - 23 MG/DL    CREATININE 0.9 0.9 - 1.3 MG/DL    Glucose 93 70 - 99 MG/DL    Calcium 8.8 8.3 - 10.6 MG/DL    GFR Non-African American >60 >60 mL/min/1.73m2    GFR African American >60 >60 mL/min/1.73m2   Protime-INR    Collection Time: 06/25/19  2:42 AM   Result Value Ref Range    Protime 36.0 (H) 9.12 - 12.5 SECONDS    INR 3.14 INDEX   Phosphorus    Collection Time: 06/25/19  2:42 AM   Result Value Ref Range    Phosphorus 3.5 2.5 - 4.9 MG/DL   Magnesium    Collection Time: 06/25/19  2:42 AM   Result Value Ref Range    Magnesium 2.2 1.8 - 2.4 mg/dl   Brain Natriuretic Peptide    Collection Time: 06/25/19  2:42 AM   Result Value Ref Range    Pro-.0 (H) <300 PG/ML   Procalcitonin    Collection Time: 06/25/19  2:42 AM   Result Value Ref Range    Procalcitonin 0.041          Medical problems    Patient Active Problem List:     VERONIKA on CPAP     PLMD (periodic limb movement disorder)     Left-sided weakness     Essential hypertension     TIA (transient ischemic attack)     CAD (coronary artery disease)     Hyperglycemia     Cerebral embolism with cerebral infarction ([facility]     Ataxia due to recent stroke Impaired cognition     Acute encephalopathy      ASSESSMENT:  ---------------------    Transient Global amnesia-Metabolic encephalopathy -elevated LFT's check ammonia to r/o hepatic encephalopathy     Neg for acute CVA     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 Pt is doing better without dizziness.         Electronically signed by Junior Taylor MD on 6/25/2019 at 9:00 PM

## 2019-06-28 LAB
CULTURE: NORMAL
CULTURE: NORMAL
Lab: NORMAL
Lab: NORMAL
SPECIMEN: NORMAL
SPECIMEN: NORMAL

## 2019-10-24 ENCOUNTER — APPOINTMENT (OUTPATIENT)
Dept: CT IMAGING | Age: 79
End: 2019-10-24
Payer: MEDICARE

## 2019-10-24 ENCOUNTER — APPOINTMENT (OUTPATIENT)
Dept: GENERAL RADIOLOGY | Age: 79
End: 2019-10-24
Payer: MEDICARE

## 2019-10-24 ENCOUNTER — HOSPITAL ENCOUNTER (EMERGENCY)
Age: 79
Discharge: ANOTHER HOSPITAL | End: 2019-10-24
Attending: EMERGENCY MEDICINE
Payer: MEDICARE

## 2019-10-24 VITALS
HEIGHT: 73 IN | DIASTOLIC BLOOD PRESSURE: 77 MMHG | OXYGEN SATURATION: 94 % | HEART RATE: 85 BPM | RESPIRATION RATE: 21 BRPM | WEIGHT: 190 LBS | BODY MASS INDEX: 25.18 KG/M2 | SYSTOLIC BLOOD PRESSURE: 132 MMHG
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
## 2019-10-24 DIAGNOSIS — S22.22XA CLOSED FRACTURE OF BODY OF STERNUM, INITIAL ENCOUNTER: Primary | ICD-10-CM

## 2019-10-24 DIAGNOSIS — R79.1 SUPRATHERAPEUTIC INR: ICD-10-CM

## 2019-10-24 LAB
ABO/RH: NORMAL
ALBUMIN SERPL-MCNC: 4.7 GM/DL (ref 3.4–5)
ALP BLD-CCNC: 98 IU/L (ref 40–129)
ALT SERPL-CCNC: 57 U/L (ref 10–40)
ANION GAP SERPL CALCULATED.3IONS-SCNC: 11 MMOL/L (ref 4–16)
ANTIBODY SCREEN: NEGATIVE
AST SERPL-CCNC: 60 IU/L (ref 15–37)
BASOPHILS ABSOLUTE: 0.1 K/CU MM
BASOPHILS RELATIVE PERCENT: 0.8 % (ref 0–1)
BILIRUB SERPL-MCNC: 0.6 MG/DL (ref 0–1)
BUN BLDV-MCNC: 17 MG/DL (ref 6–23)
CALCIUM SERPL-MCNC: 9.9 MG/DL (ref 8.3–10.6)
CHLORIDE BLD-SCNC: 98 MMOL/L (ref 99–110)
CO2: 25 MMOL/L (ref 21–32)
CREAT SERPL-MCNC: 1 MG/DL (ref 0.9–1.3)
DIFFERENTIAL TYPE: ABNORMAL
EOSINOPHILS ABSOLUTE: 0.3 K/CU MM
EOSINOPHILS RELATIVE PERCENT: 4.1 % (ref 0–3)
GFR AFRICAN AMERICAN: >60 ML/MIN/1.73M2
GFR NON-AFRICAN AMERICAN: >60 ML/MIN/1.73M2
GLUCOSE BLD-MCNC: 95 MG/DL (ref 70–99)
HCT VFR BLD CALC: 48 % (ref 42–52)
HEMOGLOBIN: 15.5 GM/DL (ref 13.5–18)
IMMATURE NEUTROPHIL %: 0.7 % (ref 0–0.43)
INR BLD: 3.39 INDEX
LYMPHOCYTES ABSOLUTE: 1.2 K/CU MM
LYMPHOCYTES RELATIVE PERCENT: 15.7 % (ref 24–44)
MAGNESIUM: 2.2 MG/DL (ref 1.8–2.4)
MCH RBC QN AUTO: 29.7 PG (ref 27–31)
MCHC RBC AUTO-ENTMCNC: 32.3 % (ref 32–36)
MCV RBC AUTO: 92 FL (ref 78–100)
MONOCYTES ABSOLUTE: 0.6 K/CU MM
MONOCYTES RELATIVE PERCENT: 7.7 % (ref 0–4)
NUCLEATED RBC %: 0 %
PDW BLD-RTO: 11.7 % (ref 11.7–14.9)
PLATELET # BLD: 216 K/CU MM (ref 140–440)
PMV BLD AUTO: 10 FL (ref 7.5–11.1)
POTASSIUM SERPL-SCNC: 4.1 MMOL/L (ref 3.5–5.1)
PROTHROMBIN TIME: 39.1 SECONDS (ref 11.7–14.5)
RBC # BLD: 5.22 M/CU MM (ref 4.6–6.2)
SEGMENTED NEUTROPHILS ABSOLUTE COUNT: 5.4 K/CU MM
SEGMENTED NEUTROPHILS RELATIVE PERCENT: 71 % (ref 36–66)
SODIUM BLD-SCNC: 134 MMOL/L (ref 135–145)
TOTAL IMMATURE NEUTOROPHIL: 0.05 K/CU MM
TOTAL NUCLEATED RBC: 0 K/CU MM
TOTAL PROTEIN: 8.1 GM/DL (ref 6.4–8.2)
TROPONIN T: <0.01 NG/ML
WBC # BLD: 7.6 K/CU MM (ref 4–10.5)

## 2019-10-24 PROCEDURE — 80053 COMPREHEN METABOLIC PANEL: CPT

## 2019-10-24 PROCEDURE — 72125 CT NECK SPINE W/O DYE: CPT

## 2019-10-24 PROCEDURE — 86900 BLOOD TYPING SEROLOGIC ABO: CPT

## 2019-10-24 PROCEDURE — 86901 BLOOD TYPING SEROLOGIC RH(D): CPT

## 2019-10-24 PROCEDURE — 71275 CT ANGIOGRAPHY CHEST: CPT

## 2019-10-24 PROCEDURE — 96374 THER/PROPH/DIAG INJ IV PUSH: CPT

## 2019-10-24 PROCEDURE — 70450 CT HEAD/BRAIN W/O DYE: CPT

## 2019-10-24 PROCEDURE — 93005 ELECTROCARDIOGRAM TRACING: CPT | Performed by: EMERGENCY MEDICINE

## 2019-10-24 PROCEDURE — 85610 PROTHROMBIN TIME: CPT

## 2019-10-24 PROCEDURE — 93010 ELECTROCARDIOGRAM REPORT: CPT | Performed by: INTERNAL MEDICINE

## 2019-10-24 PROCEDURE — 86850 RBC ANTIBODY SCREEN: CPT

## 2019-10-24 PROCEDURE — 6360000004 HC RX CONTRAST MEDICATION: Performed by: EMERGENCY MEDICINE

## 2019-10-24 PROCEDURE — 83735 ASSAY OF MAGNESIUM: CPT

## 2019-10-24 PROCEDURE — 6360000002 HC RX W HCPCS: Performed by: PHYSICIAN ASSISTANT

## 2019-10-24 PROCEDURE — 84484 ASSAY OF TROPONIN QUANT: CPT

## 2019-10-24 PROCEDURE — 2580000003 HC RX 258: Performed by: EMERGENCY MEDICINE

## 2019-10-24 PROCEDURE — 99285 EMERGENCY DEPT VISIT HI MDM: CPT

## 2019-10-24 PROCEDURE — 71045 X-RAY EXAM CHEST 1 VIEW: CPT

## 2019-10-24 PROCEDURE — 74174 CTA ABD&PLVS W/CONTRAST: CPT

## 2019-10-24 PROCEDURE — 96376 TX/PRO/DX INJ SAME DRUG ADON: CPT

## 2019-10-24 PROCEDURE — 36415 COLL VENOUS BLD VENIPUNCTURE: CPT

## 2019-10-24 PROCEDURE — 85025 COMPLETE CBC W/AUTO DIFF WBC: CPT

## 2019-10-24 RX ORDER — MORPHINE SULFATE 4 MG/ML
2 INJECTION, SOLUTION INTRAMUSCULAR; INTRAVENOUS ONCE
Status: COMPLETED | OUTPATIENT
Start: 2019-10-24 | End: 2019-10-24

## 2019-10-24 RX ORDER — ZOLPIDEM TARTRATE 10 MG/1
10 TABLET ORAL NIGHTLY
COMMUNITY
Start: 2019-10-07

## 2019-10-24 RX ORDER — SODIUM CHLORIDE 0.9 % (FLUSH) 0.9 %
10 SYRINGE (ML) INJECTION 2 TIMES DAILY
Status: DISCONTINUED | OUTPATIENT
Start: 2019-10-24 | End: 2019-10-24 | Stop reason: HOSPADM

## 2019-10-24 RX ORDER — MORPHINE SULFATE 4 MG/ML
4 INJECTION, SOLUTION INTRAMUSCULAR; INTRAVENOUS EVERY 30 MIN PRN
Status: DISCONTINUED | OUTPATIENT
Start: 2019-10-24 | End: 2019-10-24 | Stop reason: HOSPADM

## 2019-10-24 RX ORDER — CLOPIDOGREL BISULFATE 75 MG/1
75 TABLET ORAL DAILY
COMMUNITY
Start: 2019-09-23

## 2019-10-24 RX ORDER — ONDANSETRON 2 MG/ML
4 INJECTION INTRAMUSCULAR; INTRAVENOUS EVERY 6 HOURS PRN
Status: DISCONTINUED | OUTPATIENT
Start: 2019-10-24 | End: 2019-10-24 | Stop reason: HOSPADM

## 2019-10-24 RX ADMIN — MORPHINE SULFATE 2 MG: 4 INJECTION, SOLUTION INTRAMUSCULAR; INTRAVENOUS at 18:37

## 2019-10-24 RX ADMIN — IOPAMIDOL 100 ML: 755 INJECTION, SOLUTION INTRAVENOUS at 18:11

## 2019-10-24 RX ADMIN — MORPHINE SULFATE 4 MG: 4 INJECTION, SOLUTION INTRAMUSCULAR; INTRAVENOUS at 20:22

## 2019-10-24 RX ADMIN — Medication 10 ML: at 18:12

## 2019-10-24 ASSESSMENT — PAIN SCALES - GENERAL
PAINLEVEL_OUTOF10: 10
PAINLEVEL_OUTOF10: 4
PAINLEVEL_OUTOF10: 5
PAINLEVEL_OUTOF10: 10

## 2019-10-24 ASSESSMENT — PAIN SCALES - WONG BAKER: WONGBAKER_NUMERICALRESPONSE: 10

## 2019-10-28 LAB
EKG ATRIAL RATE: 60 BPM
EKG DIAGNOSIS: NORMAL
EKG P AXIS: 74 DEGREES
EKG P-R INTERVAL: 236 MS
EKG Q-T INTERVAL: 448 MS
EKG QRS DURATION: 148 MS
EKG QTC CALCULATION (BAZETT): 448 MS
EKG R AXIS: -59 DEGREES
EKG T AXIS: 21 DEGREES
EKG VENTRICULAR RATE: 60 BPM

## 2019-11-14 LAB
EKG ATRIAL RATE: 56 BPM
EKG DIAGNOSIS: NORMAL
EKG P AXIS: 68 DEGREES
EKG P-R INTERVAL: 230 MS
EKG Q-T INTERVAL: 450 MS
EKG QRS DURATION: 144 MS
EKG QTC CALCULATION (BAZETT): 434 MS
EKG R AXIS: -58 DEGREES
EKG T AXIS: 27 DEGREES
EKG VENTRICULAR RATE: 56 BPM

## 2020-09-20 ENCOUNTER — HOSPITAL ENCOUNTER (EMERGENCY)
Age: 80
Discharge: HOME | End: 2020-09-20
Attending: EMERGENCY MEDICINE
Payer: MEDICARE

## 2020-09-20 VITALS
HEART RATE: 92 BPM | TEMPERATURE: 97.2 F | WEIGHT: 180 LBS | RESPIRATION RATE: 16 BRPM | SYSTOLIC BLOOD PRESSURE: 143 MMHG | BODY MASS INDEX: 24.38 KG/M2 | OXYGEN SATURATION: 94 % | DIASTOLIC BLOOD PRESSURE: 92 MMHG | HEIGHT: 72 IN

## 2020-09-20 PROCEDURE — 93005 ELECTROCARDIOGRAM TRACING: CPT | Performed by: EMERGENCY MEDICINE

## 2020-09-20 PROCEDURE — 99283 EMERGENCY DEPT VISIT LOW MDM: CPT

## 2020-09-20 NOTE — ED NOTES
Bed: H-02  Expected date:   Expected time:   Means of arrival:   Comments:  [facility]ic [address] [facility]  09/20/20 1268
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
## 2020-09-21 PROCEDURE — 93010 ELECTROCARDIOGRAM REPORT: CPT | Performed by: INTERNAL MEDICINE

## 2020-09-23 LAB
EKG ATRIAL RATE: 94 BPM
EKG DIAGNOSIS: NORMAL
EKG P AXIS: 117 DEGREES
EKG P-R INTERVAL: 220 MS
EKG Q-T INTERVAL: 378 MS
EKG QRS DURATION: 138 MS
EKG QTC CALCULATION (BAZETT): 472 MS
EKG R AXIS: -70 DEGREES
EKG T AXIS: 57 DEGREES
EKG VENTRICULAR RATE: 94 BPM

## 2021-06-02 ENCOUNTER — HOSPITAL ENCOUNTER (OUTPATIENT)
Age: 81
Setting detail: SPECIMEN
Discharge: HOME | End: 2021-06-02
Payer: MEDICARE

## 2021-06-02 LAB
ESTIMATED AVERAGE GLUCOSE: 114 MG/DL
HBA1C MFR BLD: 5.6 % (ref 4.2–6.3)

## 2021-06-02 PROCEDURE — 83036 HEMOGLOBIN GLYCOSYLATED A1C: CPT

## 2021-06-02 PROCEDURE — 36415 COLL VENOUS BLD VENIPUNCTURE: CPT

## 2021-09-07 ENCOUNTER — HOSPITAL ENCOUNTER (OUTPATIENT)
Age: 81
Setting detail: SPECIMEN
Discharge: HOME | End: 2021-09-07
Payer: MEDICARE

## 2021-09-07 LAB
ALBUMIN SERPL-MCNC: 4.2 GM/DL (ref 3.4–5)
ALP BLD-CCNC: 97 IU/L (ref 40–128)
ALT SERPL-CCNC: 38 U/L (ref 10–40)
ANION GAP SERPL CALCULATED.3IONS-SCNC: 11 MMOL/L (ref 4–16)
AST SERPL-CCNC: 35 IU/L (ref 15–37)
BILIRUB SERPL-MCNC: 0.6 MG/DL (ref 0–1)
BUN BLDV-MCNC: 16 MG/DL (ref 6–23)
CALCIUM SERPL-MCNC: 9.7 MG/DL (ref 8.3–10.6)
CHLORIDE BLD-SCNC: 104 MMOL/L (ref 99–110)
CHOLESTEROL: 142 MG/DL
CO2: 24 MMOL/L (ref 21–32)
CREAT SERPL-MCNC: 1.1 MG/DL (ref 0.9–1.3)
ESTIMATED AVERAGE GLUCOSE: 114 MG/DL
GFR AFRICAN AMERICAN: >60 ML/MIN/1.73M2
GFR NON-AFRICAN AMERICAN: >60 ML/MIN/1.73M2
GLUCOSE BLD-MCNC: 113 MG/DL (ref 70–99)
HBA1C MFR BLD: 5.6 % (ref 4.2–6.3)
HCT VFR BLD CALC: 46.6 % (ref 42–52)
HDLC SERPL-MCNC: 35 MG/DL
HEMOGLOBIN: 15.5 GM/DL (ref 13.5–18)
LDL CHOLESTEROL DIRECT: 67 MG/DL
MCH RBC QN AUTO: 29.9 PG (ref 27–31)
MCHC RBC AUTO-ENTMCNC: 33.3 % (ref 32–36)
MCV RBC AUTO: 90 FL (ref 78–100)
PDW BLD-RTO: 12.4 % (ref 11.7–14.9)
PLATELET # BLD: 217 K/CU MM (ref 140–440)
PMV BLD AUTO: 9.6 FL (ref 7.5–11.1)
POTASSIUM SERPL-SCNC: 4.3 MMOL/L (ref 3.5–5.1)
RBC # BLD: 5.18 M/CU MM (ref 4.6–6.2)
SODIUM BLD-SCNC: 139 MMOL/L (ref 135–145)
TOTAL PROTEIN: 6.5 GM/DL (ref 6.4–8.2)
TRIGL SERPL-MCNC: 330 MG/DL
TSH HIGH SENSITIVITY: 3.07 UIU/ML (ref 0.27–4.2)
WBC # BLD: 6.3 K/CU MM (ref 4–10.5)

## 2021-09-07 PROCEDURE — 84443 ASSAY THYROID STIM HORMONE: CPT

## 2021-09-07 PROCEDURE — 83036 HEMOGLOBIN GLYCOSYLATED A1C: CPT

## 2021-09-07 PROCEDURE — 80053 COMPREHEN METABOLIC PANEL: CPT

## 2021-09-07 PROCEDURE — 85027 COMPLETE CBC AUTOMATED: CPT

## 2021-09-07 PROCEDURE — 36415 COLL VENOUS BLD VENIPUNCTURE: CPT

## 2021-09-07 PROCEDURE — 83721 ASSAY OF BLOOD LIPOPROTEIN: CPT

## 2021-09-07 PROCEDURE — 80061 LIPID PANEL: CPT

## 2021-12-06 ENCOUNTER — HOSPITAL ENCOUNTER (OUTPATIENT)
Age: 81
Setting detail: SPECIMEN
Discharge: HOME | End: 2021-12-06
Payer: MEDICARE

## 2021-12-06 LAB
ESTIMATED AVERAGE GLUCOSE: 120 MG/DL
HBA1C MFR BLD: 5.8 % (ref 4.2–6.3)

## 2021-12-06 PROCEDURE — 83036 HEMOGLOBIN GLYCOSYLATED A1C: CPT

## 2021-12-06 PROCEDURE — 36415 COLL VENOUS BLD VENIPUNCTURE: CPT

## 2021-12-22 ENCOUNTER — HOSPITAL ENCOUNTER (OUTPATIENT)
Age: 81
Setting detail: SPECIMEN
Discharge: HOME | End: 2021-12-22
Payer: MEDICARE

## 2021-12-22 LAB
HCT VFR BLD CALC: 45.9 % (ref 42–52)
HEMOGLOBIN: 14.9 GM/DL (ref 13.5–18)
MCH RBC QN AUTO: 30.2 PG (ref 27–31)
MCHC RBC AUTO-ENTMCNC: 32.5 % (ref 32–36)
MCV RBC AUTO: 92.9 FL (ref 78–100)
PDW BLD-RTO: 12.7 % (ref 11.7–14.9)
PLATELET # BLD: 201 K/CU MM (ref 140–440)
PMV BLD AUTO: 9.8 FL (ref 7.5–11.1)
RBC # BLD: 4.94 M/CU MM (ref 4.6–6.2)
WBC # BLD: 6.6 K/CU MM (ref 4–10.5)

## 2021-12-22 PROCEDURE — 85027 COMPLETE CBC AUTOMATED: CPT

## 2021-12-22 PROCEDURE — 36415 COLL VENOUS BLD VENIPUNCTURE: CPT

## 2022-03-06 ENCOUNTER — HOSPITAL ENCOUNTER (OUTPATIENT)
Age: 82
Setting detail: SPECIMEN
Discharge: HOME | End: 2022-03-06
Payer: MEDICARE

## 2022-03-06 LAB
ESTIMATED AVERAGE GLUCOSE: 120 MG/DL
HBA1C MFR BLD: 5.8 % (ref 4.2–6.3)

## 2022-03-06 PROCEDURE — 83036 HEMOGLOBIN GLYCOSYLATED A1C: CPT

## 2022-03-06 PROCEDURE — 36415 COLL VENOUS BLD VENIPUNCTURE: CPT

## 2022-04-20 ENCOUNTER — HOSPITAL ENCOUNTER (OUTPATIENT)
Age: 82
Setting detail: SPECIMEN
Discharge: HOME | End: 2022-04-20
Payer: MEDICARE

## 2022-04-20 LAB — PSA ULTRASENSITIVE: 3.63 NG/ML (ref 0–4)

## 2022-04-20 PROCEDURE — 82043 UR ALBUMIN QUANTITATIVE: CPT

## 2022-04-20 PROCEDURE — 36415 COLL VENOUS BLD VENIPUNCTURE: CPT

## 2022-04-20 PROCEDURE — 82570 ASSAY OF URINE CREATININE: CPT

## 2022-04-20 PROCEDURE — 84153 ASSAY OF PSA TOTAL: CPT

## 2022-04-21 LAB
CREATININE URINE: 158.5 MG/DL (ref 39–259)
MICROALBUMIN/CREAT 24H UR: <1.2 MG/DL
MICROALBUMIN/CREAT UR-RTO: NORMAL MG/G CREAT (ref 0–30)

## 2022-06-06 ENCOUNTER — HOSPITAL ENCOUNTER (OUTPATIENT)
Age: 82
Setting detail: SPECIMEN
Discharge: HOME | End: 2022-06-06
Payer: MEDICARE

## 2022-06-06 LAB
ESTIMATED AVERAGE GLUCOSE: 120 MG/DL
HBA1C MFR BLD: 5.8 % (ref 4.2–6.3)

## 2022-06-06 PROCEDURE — 83036 HEMOGLOBIN GLYCOSYLATED A1C: CPT

## 2022-06-06 PROCEDURE — 36415 COLL VENOUS BLD VENIPUNCTURE: CPT

## 2022-09-01 ENCOUNTER — HOSPITAL ENCOUNTER (OUTPATIENT)
Age: 82
Setting detail: SPECIMEN
Discharge: HOME | End: 2022-09-01
Payer: MEDICARE

## 2022-09-01 LAB
CHOLESTEROL: 112 MG/DL
ESTIMATED AVERAGE GLUCOSE: 111 MG/DL
HBA1C MFR BLD: 5.5 % (ref 4.2–6.3)
HDLC SERPL-MCNC: 37 MG/DL
LDL CHOLESTEROL CALCULATED: 42 MG/DL
TRIGL SERPL-MCNC: 166 MG/DL
TSH HIGH SENSITIVITY: 4.42 UIU/ML (ref 0.27–4.2)

## 2022-09-01 PROCEDURE — 83036 HEMOGLOBIN GLYCOSYLATED A1C: CPT

## 2022-09-01 PROCEDURE — 84443 ASSAY THYROID STIM HORMONE: CPT

## 2022-09-01 PROCEDURE — 36415 COLL VENOUS BLD VENIPUNCTURE: CPT

## 2022-09-01 PROCEDURE — 80061 LIPID PANEL: CPT

## 2022-12-01 ENCOUNTER — HOSPITAL ENCOUNTER (OUTPATIENT)
Age: 82
Setting detail: SPECIMEN
Discharge: HOME | End: 2022-12-01
Payer: MEDICARE

## 2022-12-01 LAB
ESTIMATED AVERAGE GLUCOSE: 117 MG/DL
HBA1C MFR BLD: 5.7 % (ref 4.2–6.3)

## 2022-12-01 PROCEDURE — 36415 COLL VENOUS BLD VENIPUNCTURE: CPT

## 2022-12-01 PROCEDURE — 83036 HEMOGLOBIN GLYCOSYLATED A1C: CPT

## 2023-03-02 ENCOUNTER — HOSPITAL ENCOUNTER (OUTPATIENT)
Age: 83
Setting detail: SPECIMEN
Discharge: HOME | End: 2023-03-02
Payer: MEDICARE

## 2023-03-02 LAB
ESTIMATED AVERAGE GLUCOSE: 123 MG/DL
HBA1C MFR BLD: 5.9 % (ref 4.2–6.3)

## 2023-03-02 PROCEDURE — 83036 HEMOGLOBIN GLYCOSYLATED A1C: CPT

## 2023-03-02 PROCEDURE — 36415 COLL VENOUS BLD VENIPUNCTURE: CPT

## 2023-04-16 ENCOUNTER — HOSPITAL ENCOUNTER (OUTPATIENT)
Age: 83
Setting detail: SPECIMEN
Discharge: HOME | End: 2023-04-16
Payer: MEDICARE

## 2023-04-16 PROCEDURE — 82570 ASSAY OF URINE CREATININE: CPT

## 2023-04-16 PROCEDURE — 82043 UR ALBUMIN QUANTITATIVE: CPT

## 2023-04-17 LAB
CREAT UR-MCNC: 157.7 MG/DL (ref 39–259)
MICROALBUMIN 24H UR-MCNC: <1.2 MG/DL
MICROALBUMIN/CREAT UR-RTO: NORMAL MG/G CREAT (ref 0–30)

## 2023-04-20 ENCOUNTER — HOSPITAL ENCOUNTER (OUTPATIENT)
Age: 83
Setting detail: SPECIMEN
Discharge: HOME | End: 2023-04-20
Payer: MEDICARE

## 2023-04-20 LAB — PSA ULTRASENSITIVE: 4.16 NG/ML (ref 0–4)

## 2023-04-20 PROCEDURE — 84153 ASSAY OF PSA TOTAL: CPT

## 2023-06-05 ENCOUNTER — HOSPITAL ENCOUNTER (OUTPATIENT)
Age: 83
Setting detail: SPECIMEN
Discharge: HOME | End: 2023-06-05
Payer: MEDICARE

## 2023-06-05 LAB
ESTIMATED AVERAGE GLUCOSE: 117 MG/DL
HBA1C MFR BLD: 5.7 % (ref 4.2–6.3)

## 2023-06-05 PROCEDURE — 36415 COLL VENOUS BLD VENIPUNCTURE: CPT

## 2023-06-05 PROCEDURE — 83036 HEMOGLOBIN GLYCOSYLATED A1C: CPT

## 2023-09-01 ENCOUNTER — HOSPITAL ENCOUNTER (OUTPATIENT)
Age: 83
Setting detail: SPECIMEN
Discharge: HOME | End: 2023-09-01
Payer: MEDICARE

## 2023-09-01 LAB
CHOLEST SERPL-MCNC: 131 MG/DL
ESTIMATED AVERAGE GLUCOSE: 131 MG/DL
HBA1C MFR BLD: 6.2 % (ref 4.2–6.3)
HDLC SERPL-MCNC: 31 MG/DL
LDLC SERPL CALC-MCNC: 27 MG/DL
TRIGL SERPL-MCNC: 367 MG/DL
TSH SERPL DL<=0.005 MIU/L-ACNC: 4.37 UIU/ML (ref 0.27–4.2)

## 2023-09-01 PROCEDURE — 36415 COLL VENOUS BLD VENIPUNCTURE: CPT

## 2023-09-01 PROCEDURE — 80061 LIPID PANEL: CPT

## 2023-09-01 PROCEDURE — 84443 ASSAY THYROID STIM HORMONE: CPT

## 2023-09-01 PROCEDURE — 83036 HEMOGLOBIN GLYCOSYLATED A1C: CPT

## 2023-10-18 ENCOUNTER — HOSPITAL ENCOUNTER (OUTPATIENT)
Age: 83
Setting detail: SPECIMEN
Discharge: HOME | End: 2023-10-18
Payer: MEDICARE

## 2023-10-18 LAB — TSH SERPL DL<=0.005 MIU/L-ACNC: 3.56 UIU/ML (ref 0.27–4.2)

## 2023-10-18 PROCEDURE — 84443 ASSAY THYROID STIM HORMONE: CPT

## 2023-10-18 PROCEDURE — 36415 COLL VENOUS BLD VENIPUNCTURE: CPT

## 2023-12-06 ENCOUNTER — HOSPITAL ENCOUNTER (OUTPATIENT)
Age: 83
Setting detail: SPECIMEN
Discharge: HOME | End: 2023-12-06
Payer: MEDICARE

## 2023-12-06 LAB
ESTIMATED AVERAGE GLUCOSE: 120 MG/DL
HBA1C MFR BLD: 5.8 % (ref 4.2–6.3)

## 2023-12-06 PROCEDURE — 83036 HEMOGLOBIN GLYCOSYLATED A1C: CPT

## 2023-12-06 PROCEDURE — 36415 COLL VENOUS BLD VENIPUNCTURE: CPT

## 2024-03-06 ENCOUNTER — HOSPITAL ENCOUNTER (OUTPATIENT)
Age: 84
Setting detail: SPECIMEN
Discharge: HOME | End: 2024-03-06
Payer: MEDICARE

## 2024-03-06 LAB
ESTIMATED AVERAGE GLUCOSE: 114 MG/DL
HBA1C MFR BLD: 5.6 % (ref 4.2–6.3)

## 2024-03-06 PROCEDURE — 83036 HEMOGLOBIN GLYCOSYLATED A1C: CPT

## 2024-03-06 PROCEDURE — 36415 COLL VENOUS BLD VENIPUNCTURE: CPT

## 2024-04-03 ENCOUNTER — HOSPITAL ENCOUNTER (OUTPATIENT)
Age: 84
Setting detail: SPECIMEN
Discharge: HOME | End: 2024-04-03
Payer: MEDICARE

## 2024-04-03 LAB
ALBUMIN SERPL-MCNC: 4.1 GM/DL (ref 3.4–5)
ALP BLD-CCNC: 86 IU/L (ref 40–128)
ALT SERPL-CCNC: 35 U/L (ref 10–40)
ANION GAP SERPL CALCULATED.3IONS-SCNC: 14 MMOL/L (ref 7–16)
AST SERPL-CCNC: 35 IU/L (ref 15–37)
BILIRUB SERPL-MCNC: 0.7 MG/DL (ref 0–1)
BUN SERPL-MCNC: 25 MG/DL (ref 6–23)
CALCIUM SERPL-MCNC: 9 MG/DL (ref 8.3–10.6)
CHLORIDE BLD-SCNC: 105 MMOL/L (ref 99–110)
CO2: 21 MMOL/L (ref 21–32)
CREAT SERPL-MCNC: 1 MG/DL (ref 0.9–1.3)
ESTIMATED AVERAGE GLUCOSE: 108 MG/DL
GFR SERPL CREATININE-BSD FRML MDRD: 76 ML/MIN/1.73M2
GLUCOSE SERPL-MCNC: 84 MG/DL (ref 70–99)
HBA1C MFR BLD: 5.4 % (ref 4.2–6.3)
HCT VFR BLD CALC: 44.9 % (ref 42–52)
HEMOGLOBIN: 14.9 GM/DL (ref 13.5–18)
MCH RBC QN AUTO: 30.1 PG (ref 27–31)
MCHC RBC AUTO-ENTMCNC: 33.2 % (ref 32–36)
MCV RBC AUTO: 90.7 FL (ref 78–100)
PDW BLD-RTO: 12.8 % (ref 11.7–14.9)
PLATELET # BLD: 224 K/CU MM (ref 140–440)
PMV BLD AUTO: 9.6 FL (ref 7.5–11.1)
POTASSIUM SERPL-SCNC: 4.2 MMOL/L (ref 3.5–5.1)
PROSTATE SPECIFIC ANTIGEN: 4.41 NG/ML (ref 0–4)
RBC # BLD: 4.95 M/CU MM (ref 4.6–6.2)
SODIUM BLD-SCNC: 140 MMOL/L (ref 135–145)
TOTAL PROTEIN: 6.3 GM/DL (ref 6.4–8.2)
TSH SERPL DL<=0.005 MIU/L-ACNC: 3.09 UIU/ML (ref 0.27–4.2)
WBC # BLD: 7.9 K/CU MM (ref 4–10.5)

## 2024-04-03 PROCEDURE — G0103 PSA SCREENING: HCPCS

## 2024-04-03 PROCEDURE — 83036 HEMOGLOBIN GLYCOSYLATED A1C: CPT

## 2024-04-03 PROCEDURE — 36415 COLL VENOUS BLD VENIPUNCTURE: CPT

## 2024-04-03 PROCEDURE — 84443 ASSAY THYROID STIM HORMONE: CPT

## 2024-04-03 PROCEDURE — 85027 COMPLETE CBC AUTOMATED: CPT

## 2024-04-03 PROCEDURE — 80053 COMPREHEN METABOLIC PANEL: CPT

## 2024-04-18 ENCOUNTER — HOSPITAL ENCOUNTER (OUTPATIENT)
Age: 84
Setting detail: SPECIMEN
Discharge: HOME | End: 2024-04-18
Payer: MEDICARE

## 2024-04-18 LAB
CREAT UR-MCNC: 152.4 MG/DL (ref 39–259)
MICROALBUMIN 24H UR-MCNC: <1.2 MG/DL
MICROALBUMIN/CREAT UR-RTO: NORMAL MG/G CREAT (ref 0–30)

## 2024-04-18 PROCEDURE — 82043 UR ALBUMIN QUANTITATIVE: CPT

## 2024-04-18 PROCEDURE — 82570 ASSAY OF URINE CREATININE: CPT

## 2024-07-05 ENCOUNTER — HOSPITAL ENCOUNTER (OUTPATIENT)
Age: 84
Setting detail: SPECIMEN
Discharge: HOME | End: 2024-07-05
Payer: MEDICARE

## 2024-07-05 LAB
ALBUMIN SERPL-MCNC: 4 GM/DL (ref 3.4–5)
ALP BLD-CCNC: 106 IU/L (ref 40–128)
ALT SERPL-CCNC: 36 U/L (ref 10–40)
ANION GAP SERPL CALCULATED.3IONS-SCNC: 18 MMOL/L (ref 7–16)
AST SERPL-CCNC: 39 IU/L (ref 15–37)
BILIRUB SERPL-MCNC: 0.7 MG/DL (ref 0–1)
BUN SERPL-MCNC: 22 MG/DL (ref 6–23)
CALCIUM SERPL-MCNC: 9.3 MG/DL (ref 8.3–10.6)
CHLORIDE BLD-SCNC: 104 MMOL/L (ref 99–110)
CO2: 20 MMOL/L (ref 21–32)
CREAT SERPL-MCNC: 1 MG/DL (ref 0.9–1.3)
GFR, ESTIMATED: 76 ML/MIN/1.73M2
GLUCOSE SERPL-MCNC: 80 MG/DL (ref 70–99)
HCT VFR BLD CALC: 43.7 % (ref 42–52)
HEMOGLOBIN: 14.5 GM/DL (ref 13.5–18)
MCH RBC QN AUTO: 30.1 PG (ref 27–31)
MCHC RBC AUTO-ENTMCNC: 33.2 % (ref 32–36)
MCV RBC AUTO: 90.7 FL (ref 78–100)
PDW BLD-RTO: 12.5 % (ref 11.7–14.9)
PLATELET # BLD: 254 K/CU MM (ref 140–440)
PMV BLD AUTO: 9.8 FL (ref 7.5–11.1)
POTASSIUM SERPL-SCNC: 4.4 MMOL/L (ref 3.5–5.1)
RBC # BLD: 4.82 M/CU MM (ref 4.6–6.2)
SODIUM BLD-SCNC: 142 MMOL/L (ref 135–145)
TOTAL PROTEIN: 7.1 GM/DL (ref 6.4–8.2)
WBC # BLD: 7.2 K/CU MM (ref 4–10.5)

## 2024-07-05 PROCEDURE — 80053 COMPREHEN METABOLIC PANEL: CPT

## 2024-07-05 PROCEDURE — 85027 COMPLETE CBC AUTOMATED: CPT

## 2024-07-05 PROCEDURE — 36415 COLL VENOUS BLD VENIPUNCTURE: CPT

## 2024-09-05 ENCOUNTER — HOSPITAL ENCOUNTER (OUTPATIENT)
Age: 84
Setting detail: SPECIMEN
Discharge: HOME | End: 2024-09-05
Payer: MEDICARE

## 2024-09-05 LAB
CHOLEST SERPL-MCNC: 120 MG/DL
ESTIMATED AVERAGE GLUCOSE: 114 MG/DL
HBA1C MFR BLD: 5.6 % (ref 4.2–6.3)
HDLC SERPL-MCNC: 35 MG/DL
LDLC SERPL CALC-MCNC: 52 MG/DL
TRIGL SERPL-MCNC: 164 MG/DL

## 2024-09-05 PROCEDURE — 83036 HEMOGLOBIN GLYCOSYLATED A1C: CPT

## 2024-09-05 PROCEDURE — 80061 LIPID PANEL: CPT

## 2024-09-05 PROCEDURE — 36415 COLL VENOUS BLD VENIPUNCTURE: CPT

## 2024-10-02 ENCOUNTER — HOSPITAL ENCOUNTER (OUTPATIENT)
Age: 84
Setting detail: SPECIMEN
Discharge: HOME | End: 2024-10-02
Payer: MEDICARE

## 2024-10-02 LAB
ALBUMIN SERPL-MCNC: 3.7 G/DL (ref 3.4–5)
ALBUMIN/GLOB SERPL: 1.6 {RATIO} (ref 1.1–2.2)
ALP SERPL-CCNC: 102 U/L (ref 40–129)
ALT SERPL-CCNC: 30 U/L (ref 10–40)
ANION GAP SERPL CALCULATED.3IONS-SCNC: 14 MMOL/L (ref 9–17)
AST SERPL-CCNC: 31 U/L (ref 15–37)
BILIRUB SERPL-MCNC: 0.6 MG/DL (ref 0–1)
BUN SERPL-MCNC: 15 MG/DL (ref 7–20)
CALCIUM SERPL-MCNC: 9.1 MG/DL (ref 8.3–10.6)
CHLORIDE SERPL-SCNC: 104 MMOL/L (ref 99–110)
CO2 SERPL-SCNC: 21 MMOL/L (ref 21–32)
CREAT SERPL-MCNC: 1.1 MG/DL (ref 0.8–1.3)
ERYTHROCYTE [DISTWIDTH] IN BLOOD BY AUTOMATED COUNT: 12.8 % (ref 11.7–14.9)
GFR, ESTIMATED: 63 ML/MIN/1.73M2
GLUCOSE SERPL-MCNC: 101 MG/DL (ref 74–99)
HCT VFR BLD AUTO: 42.7 % (ref 42–52)
HGB BLD-MCNC: 14.4 G/DL (ref 13.5–18)
MCH RBC QN AUTO: 30.2 PG (ref 27–31)
MCHC RBC AUTO-ENTMCNC: 33.7 G/DL (ref 32–36)
MCV RBC AUTO: 89.5 FL (ref 78–100)
PLATELET # BLD AUTO: 218 K/UL (ref 140–440)
PMV BLD AUTO: 9.7 FL (ref 7.5–11.1)
POTASSIUM SERPL-SCNC: 4.4 MMOL/L (ref 3.5–5.1)
PROT SERPL-MCNC: 6.1 G/DL (ref 6.4–8.2)
RBC # BLD AUTO: 4.77 M/UL (ref 4.6–6.2)
SODIUM SERPL-SCNC: 139 MMOL/L (ref 136–145)
TSH SERPL DL<=0.05 MIU/L-ACNC: 2.62 UIU/ML (ref 0.27–4.2)
WBC OTHER # BLD: 6.8 K/UL (ref 4–10.5)

## 2024-10-02 PROCEDURE — 80053 COMPREHEN METABOLIC PANEL: CPT

## 2024-10-02 PROCEDURE — 84443 ASSAY THYROID STIM HORMONE: CPT

## 2024-10-02 PROCEDURE — 36415 COLL VENOUS BLD VENIPUNCTURE: CPT

## 2024-10-02 PROCEDURE — 85027 COMPLETE CBC AUTOMATED: CPT

## 2025-01-07 ENCOUNTER — HOSPITAL ENCOUNTER (OUTPATIENT)
Age: 85
Setting detail: SPECIMEN
Discharge: HOME | End: 2025-01-07
Payer: MEDICARE

## 2025-01-07 LAB
ALBUMIN SERPL-MCNC: 3.8 G/DL (ref 3.4–5)
ALBUMIN/GLOB SERPL: 1.7 {RATIO} (ref 1.1–2.2)
ALP SERPL-CCNC: 94 U/L (ref 40–129)
ALT SERPL-CCNC: 32 U/L (ref 10–40)
ANION GAP SERPL CALCULATED.3IONS-SCNC: 10 MMOL/L (ref 9–17)
AST SERPL-CCNC: 33 U/L (ref 15–37)
BILIRUB SERPL-MCNC: 0.6 MG/DL (ref 0–1)
BUN SERPL-MCNC: 14 MG/DL (ref 7–20)
CALCIUM SERPL-MCNC: 9.2 MG/DL (ref 8.3–10.6)
CHLORIDE SERPL-SCNC: 104 MMOL/L (ref 99–110)
CO2 SERPL-SCNC: 25 MMOL/L (ref 21–32)
CREAT SERPL-MCNC: 1 MG/DL (ref 0.8–1.3)
ERYTHROCYTE [DISTWIDTH] IN BLOOD BY AUTOMATED COUNT: 12.5 % (ref 11.7–14.9)
GFR, ESTIMATED: 69 ML/MIN/1.73M2
GLUCOSE SERPL-MCNC: 93 MG/DL (ref 74–99)
HCT VFR BLD AUTO: 42.4 % (ref 42–52)
HGB BLD-MCNC: 14.4 G/DL (ref 13.5–18)
MCH RBC QN AUTO: 30.4 PG (ref 27–31)
MCHC RBC AUTO-ENTMCNC: 34 G/DL (ref 32–36)
MCV RBC AUTO: 89.5 FL (ref 78–100)
PLATELET # BLD AUTO: 214 K/UL (ref 140–440)
PMV BLD AUTO: 9.9 FL (ref 7.5–11.1)
POTASSIUM SERPL-SCNC: 4.4 MMOL/L (ref 3.5–5.1)
PROT SERPL-MCNC: 6 G/DL (ref 6.4–8.2)
RBC # BLD AUTO: 4.74 M/UL (ref 4.6–6.2)
SODIUM SERPL-SCNC: 139 MMOL/L (ref 136–145)
WBC OTHER # BLD: 8.6 K/UL (ref 4–10.5)

## 2025-01-07 PROCEDURE — 85027 COMPLETE CBC AUTOMATED: CPT

## 2025-01-07 PROCEDURE — 80053 COMPREHEN METABOLIC PANEL: CPT

## 2025-03-04 ENCOUNTER — HOSPITAL ENCOUNTER (OUTPATIENT)
Age: 85
Setting detail: SPECIMEN
Discharge: HOME | End: 2025-03-04
Payer: MEDICARE

## 2025-03-04 LAB
EST. AVERAGE GLUCOSE BLD GHB EST-MCNC: 127 MG/DL
HBA1C MFR BLD: 6.1 % (ref 4.2–6.3)

## 2025-03-04 PROCEDURE — 83036 HEMOGLOBIN GLYCOSYLATED A1C: CPT

## 2025-03-26 ENCOUNTER — HOSPITAL ENCOUNTER (OUTPATIENT)
Age: 85
Setting detail: SPECIMEN
Discharge: HOME | End: 2025-03-26
Payer: MEDICARE

## 2025-03-26 LAB
ANION GAP SERPL CALCULATED.3IONS-SCNC: 14 MMOL/L (ref 9–17)
BUN SERPL-MCNC: 48 MG/DL (ref 7–20)
CALCIUM SERPL-MCNC: 8.9 MG/DL (ref 8.3–10.6)
CHLORIDE SERPL-SCNC: 104 MMOL/L (ref 99–110)
CO2 SERPL-SCNC: 20 MMOL/L (ref 21–32)
CREAT SERPL-MCNC: 2.2 MG/DL (ref 0.8–1.3)
ERYTHROCYTE [DISTWIDTH] IN BLOOD BY AUTOMATED COUNT: 13.4 % (ref 11.7–14.9)
GFR, ESTIMATED: 29 ML/MIN/1.73M2
GLUCOSE SERPL-MCNC: 103 MG/DL (ref 74–99)
HCT VFR BLD AUTO: 46.9 % (ref 42–52)
HGB BLD-MCNC: 15.2 G/DL (ref 13.5–18)
MCH RBC QN AUTO: 29.7 PG (ref 27–31)
MCHC RBC AUTO-ENTMCNC: 32.4 G/DL (ref 32–36)
MCV RBC AUTO: 91.6 FL (ref 78–100)
PLATELET # BLD AUTO: 223 K/UL (ref 140–440)
PMV BLD AUTO: 10 FL (ref 7.5–11.1)
POTASSIUM SERPL-SCNC: 4.4 MMOL/L (ref 3.5–5.1)
RBC # BLD AUTO: 5.12 M/UL (ref 4.6–6.2)
SODIUM SERPL-SCNC: 138 MMOL/L (ref 136–145)
WBC OTHER # BLD: 9 K/UL (ref 4–10.5)

## 2025-03-26 PROCEDURE — 85027 COMPLETE CBC AUTOMATED: CPT

## 2025-03-26 PROCEDURE — 80048 BASIC METABOLIC PNL TOTAL CA: CPT

## 2025-03-31 ENCOUNTER — HOSPITAL ENCOUNTER (OUTPATIENT)
Age: 85
Setting detail: SPECIMEN
Discharge: HOME | End: 2025-03-31
Payer: MEDICARE

## 2025-03-31 LAB
ANION GAP SERPL CALCULATED.3IONS-SCNC: 13 MMOL/L (ref 9–17)
BUN SERPL-MCNC: 23 MG/DL (ref 7–20)
CALCIUM SERPL-MCNC: 9.5 MG/DL (ref 8.3–10.6)
CHLORIDE SERPL-SCNC: 106 MMOL/L (ref 99–110)
CO2 SERPL-SCNC: 22 MMOL/L (ref 21–32)
CREAT SERPL-MCNC: 1.2 MG/DL (ref 0.8–1.3)
GFR, ESTIMATED: 57 ML/MIN/1.73M2
GLUCOSE SERPL-MCNC: 157 MG/DL (ref 74–99)
POTASSIUM SERPL-SCNC: 3.9 MMOL/L (ref 3.5–5.1)
SODIUM SERPL-SCNC: 141 MMOL/L (ref 136–145)

## 2025-03-31 PROCEDURE — 80048 BASIC METABOLIC PNL TOTAL CA: CPT

## 2025-04-04 ENCOUNTER — HOSPITAL ENCOUNTER (OUTPATIENT)
Age: 85
Setting detail: SPECIMEN
Discharge: HOME | End: 2025-04-04
Payer: MEDICARE

## 2025-04-04 LAB
ALBUMIN SERPL-MCNC: 4 G/DL (ref 3.4–5)
ALBUMIN/GLOB SERPL: 1.6 {RATIO} (ref 1.1–2.2)
ALP SERPL-CCNC: 86 U/L (ref 40–129)
ALT SERPL-CCNC: 46 U/L (ref 10–40)
ANION GAP SERPL CALCULATED.3IONS-SCNC: 12 MMOL/L (ref 9–17)
AST SERPL-CCNC: 44 U/L (ref 15–37)
BILIRUB SERPL-MCNC: 0.7 MG/DL (ref 0–1)
BUN SERPL-MCNC: 16 MG/DL (ref 7–20)
CALCIUM SERPL-MCNC: 9.8 MG/DL (ref 8.3–10.6)
CHLORIDE SERPL-SCNC: 104 MMOL/L (ref 99–110)
CO2 SERPL-SCNC: 24 MMOL/L (ref 21–32)
CREAT SERPL-MCNC: 1.2 MG/DL (ref 0.8–1.3)
ERYTHROCYTE [DISTWIDTH] IN BLOOD BY AUTOMATED COUNT: 12.7 % (ref 11.7–14.9)
GFR, ESTIMATED: 58 ML/MIN/1.73M2
GLUCOSE SERPL-MCNC: 104 MG/DL (ref 74–99)
HCT VFR BLD AUTO: 44.4 % (ref 42–52)
HGB BLD-MCNC: 15 G/DL (ref 13.5–18)
MCH RBC QN AUTO: 30.4 PG (ref 27–31)
MCHC RBC AUTO-ENTMCNC: 33.8 G/DL (ref 32–36)
MCV RBC AUTO: 90.1 FL (ref 78–100)
PLATELET # BLD AUTO: 288 K/UL (ref 140–440)
PMV BLD AUTO: 9.6 FL (ref 7.5–11.1)
POTASSIUM SERPL-SCNC: 4.1 MMOL/L (ref 3.5–5.1)
PROT SERPL-MCNC: 6.5 G/DL (ref 6.4–8.2)
PSA SERPL-MCNC: 3.38 NG/ML (ref 0–4)
RBC # BLD AUTO: 4.93 M/UL (ref 4.6–6.2)
SODIUM SERPL-SCNC: 140 MMOL/L (ref 136–145)
TSH SERPL DL<=0.05 MIU/L-ACNC: 3.65 UIU/ML (ref 0.27–4.2)
WBC OTHER # BLD: 8.1 K/UL (ref 4–10.5)

## 2025-04-04 PROCEDURE — 85027 COMPLETE CBC AUTOMATED: CPT

## 2025-04-04 PROCEDURE — 80053 COMPREHEN METABOLIC PANEL: CPT

## 2025-04-04 PROCEDURE — G0103 PSA SCREENING: HCPCS

## 2025-04-04 PROCEDURE — 84443 ASSAY THYROID STIM HORMONE: CPT

## 2025-04-21 ENCOUNTER — HOSPITAL ENCOUNTER (OUTPATIENT)
Age: 85
Setting detail: SPECIMEN
Discharge: HOME | End: 2025-04-21
Payer: MEDICARE

## 2025-04-21 PROCEDURE — 82043 UR ALBUMIN QUANTITATIVE: CPT

## 2025-04-21 PROCEDURE — 82570 ASSAY OF URINE CREATININE: CPT

## 2025-04-22 LAB
CREAT UR-MCNC: 240 MG/DL (ref 39–259)
MICROALBUMIN UR-MCNC: <1 MG/L
MICROALBUMIN/CREAT UR-RTO: NORMAL MCG/MG CREAT (ref 0–2)

## 2025-04-25 ENCOUNTER — HOSPITAL ENCOUNTER (OUTPATIENT)
Age: 85
Setting detail: SPECIMEN
Discharge: HOME | End: 2025-04-25
Payer: MEDICARE

## 2025-04-25 PROCEDURE — 81001 URINALYSIS AUTO W/SCOPE: CPT

## 2025-04-27 LAB
BACTERIA URNS QL MICRO: ABNORMAL
BILIRUB UR QL STRIP: NEGATIVE
CLARITY UR: ABNORMAL
COLOR UR: YELLOW
CRYSTALS URNS MICRO: ABNORMAL /HPF
EPI CELLS #/AREA URNS HPF: ABNORMAL /HPF
GLUCOSE UR STRIP-MCNC: NEGATIVE MG/DL
HGB UR QL STRIP.AUTO: NEGATIVE
KETONES UR STRIP-MCNC: NEGATIVE MG/DL
LEUKOCYTE ESTERASE UR QL STRIP: NEGATIVE
MUCOUS THREADS URNS QL MICRO: PRESENT
NITRITE UR QL STRIP: NEGATIVE
PH UR STRIP: 6 [PH] (ref 5–8)
PROT UR STRIP-MCNC: ABNORMAL MG/DL
RBC #/AREA URNS HPF: ABNORMAL /HPF
SP GR UR STRIP: >1.03 (ref 1–1.03)
UROBILINOGEN UR STRIP-ACNC: 0.2 EU/DL (ref 0.2–1)
WBC #/AREA URNS HPF: ABNORMAL /HPF

## 2025-04-28 ENCOUNTER — HOSPITAL ENCOUNTER (OUTPATIENT)
Age: 85
Setting detail: SPECIMEN
Discharge: HOME | End: 2025-04-28
Payer: MEDICARE

## 2025-04-28 LAB
ALBUMIN SERPL-MCNC: 4.1 G/DL (ref 3.4–5)
ALBUMIN/GLOB SERPL: 1.8 {RATIO} (ref 1.1–2.2)
ALP SERPL-CCNC: 81 U/L (ref 40–129)
ALT SERPL-CCNC: 39 U/L (ref 10–40)
ANION GAP SERPL CALCULATED.3IONS-SCNC: 13 MMOL/L (ref 9–17)
AST SERPL-CCNC: 38 U/L (ref 15–37)
BILIRUB SERPL-MCNC: 1.1 MG/DL (ref 0–1)
BUN SERPL-MCNC: 32 MG/DL (ref 7–20)
CALCIUM SERPL-MCNC: 9.6 MG/DL (ref 8.3–10.6)
CHLORIDE SERPL-SCNC: 109 MMOL/L (ref 99–110)
CO2 SERPL-SCNC: 23 MMOL/L (ref 21–32)
CREAT SERPL-MCNC: 1.5 MG/DL (ref 0.8–1.3)
ERYTHROCYTE [DISTWIDTH] IN BLOOD BY AUTOMATED COUNT: 13.2 % (ref 11.7–14.9)
GFR, ESTIMATED: 45 ML/MIN/1.73M2
GLUCOSE SERPL-MCNC: 133 MG/DL (ref 74–99)
HCT VFR BLD AUTO: 43.2 % (ref 42–52)
HGB BLD-MCNC: 14.6 G/DL (ref 13.5–18)
MCH RBC QN AUTO: 31.2 PG (ref 27–31)
MCHC RBC AUTO-ENTMCNC: 33.8 G/DL (ref 32–36)
MCV RBC AUTO: 92.3 FL (ref 78–100)
PLATELET # BLD AUTO: 201 K/UL (ref 140–440)
PMV BLD AUTO: 10 FL (ref 7.5–11.1)
POTASSIUM SERPL-SCNC: 4.2 MMOL/L (ref 3.5–5.1)
PROT SERPL-MCNC: 6.3 G/DL (ref 6.4–8.2)
RBC # BLD AUTO: 4.68 M/UL (ref 4.6–6.2)
SODIUM SERPL-SCNC: 144 MMOL/L (ref 136–145)
TSH SERPL DL<=0.05 MIU/L-ACNC: 1.85 UIU/ML (ref 0.27–4.2)
WBC OTHER # BLD: 5.7 K/UL (ref 4–10.5)

## 2025-04-28 PROCEDURE — 85027 COMPLETE CBC AUTOMATED: CPT

## 2025-04-28 PROCEDURE — 84443 ASSAY THYROID STIM HORMONE: CPT

## 2025-04-28 PROCEDURE — 80053 COMPREHEN METABOLIC PANEL: CPT

## 2025-07-02 ENCOUNTER — HOSPITAL ENCOUNTER (OUTPATIENT)
Age: 85
Setting detail: SPECIMEN
Discharge: HOME | End: 2025-07-02
Payer: MEDICARE

## 2025-07-02 LAB
ALBUMIN SERPL-MCNC: 3.6 G/DL (ref 3.4–5)
ALBUMIN/GLOB SERPL: 1.4 {RATIO} (ref 1.1–2.2)
ALP SERPL-CCNC: 93 U/L (ref 40–129)
ALT SERPL-CCNC: 55 U/L (ref 10–40)
ANION GAP SERPL CALCULATED.3IONS-SCNC: 13 MMOL/L (ref 9–17)
AST SERPL-CCNC: 65 U/L (ref 15–37)
BILIRUB SERPL-MCNC: 0.7 MG/DL (ref 0–1)
BUN SERPL-MCNC: 46 MG/DL (ref 7–20)
CALCIUM SERPL-MCNC: 9 MG/DL (ref 8.3–10.6)
CHLORIDE SERPL-SCNC: 107 MMOL/L (ref 99–110)
CO2 SERPL-SCNC: 20 MMOL/L (ref 21–32)
CREAT SERPL-MCNC: 1.7 MG/DL (ref 0.8–1.3)
ERYTHROCYTE [DISTWIDTH] IN BLOOD BY AUTOMATED COUNT: 13.7 % (ref 11.7–14.9)
GFR, ESTIMATED: 38 ML/MIN/1.73M2
GLUCOSE SERPL-MCNC: 107 MG/DL (ref 74–99)
HCT VFR BLD AUTO: 43.5 % (ref 42–52)
HGB BLD-MCNC: 13.8 G/DL (ref 13.5–18)
MCH RBC QN AUTO: 30.5 PG (ref 27–31)
MCHC RBC AUTO-ENTMCNC: 31.7 G/DL (ref 32–36)
MCV RBC AUTO: 96 FL (ref 78–100)
PLATELET # BLD AUTO: 237 K/UL (ref 140–440)
PMV BLD AUTO: 9.8 FL (ref 7.5–11.1)
POTASSIUM SERPL-SCNC: 4.2 MMOL/L (ref 3.5–5.1)
PROT SERPL-MCNC: 6.1 G/DL (ref 6.4–8.2)
RBC # BLD AUTO: 4.53 M/UL (ref 4.6–6.2)
SODIUM SERPL-SCNC: 140 MMOL/L (ref 136–145)
WBC OTHER # BLD: 12.9 K/UL (ref 4–10.5)

## 2025-07-02 PROCEDURE — 80053 COMPREHEN METABOLIC PANEL: CPT

## 2025-07-02 PROCEDURE — 81001 URINALYSIS AUTO W/SCOPE: CPT

## 2025-07-02 PROCEDURE — 85027 COMPLETE CBC AUTOMATED: CPT

## 2025-07-02 PROCEDURE — 87086 URINE CULTURE/COLONY COUNT: CPT

## 2025-07-03 ENCOUNTER — HOSPITAL ENCOUNTER (OUTPATIENT)
Age: 85
Setting detail: SPECIMEN
Discharge: HOME | End: 2025-07-03
Payer: MEDICARE

## 2025-07-03 LAB
ERYTHROCYTE [DISTWIDTH] IN BLOOD BY AUTOMATED COUNT: 13.4 % (ref 11.7–14.9)
HCT VFR BLD AUTO: 42.1 % (ref 42–52)
HGB BLD-MCNC: 13.6 G/DL (ref 13.5–18)
MCH RBC QN AUTO: 30.7 PG (ref 27–31)
MCHC RBC AUTO-ENTMCNC: 32.3 G/DL (ref 32–36)
MCV RBC AUTO: 95 FL (ref 78–100)
PLATELET # BLD AUTO: 224 K/UL (ref 140–440)
PMV BLD AUTO: 10.1 FL (ref 7.5–11.1)
RBC # BLD AUTO: 4.43 M/UL (ref 4.6–6.2)
WBC OTHER # BLD: 10.9 K/UL (ref 4–10.5)

## 2025-07-03 PROCEDURE — 85027 COMPLETE CBC AUTOMATED: CPT

## 2025-07-07 ENCOUNTER — HOSPITAL ENCOUNTER (OUTPATIENT)
Age: 85
Setting detail: SPECIMEN
Discharge: HOME | End: 2025-07-07
Payer: MEDICARE

## 2025-07-07 LAB
ALBUMIN SERPL-MCNC: 3.9 G/DL (ref 3.4–5)
ALBUMIN/GLOB SERPL: 1.3 {RATIO} (ref 1.1–2.2)
ALP SERPL-CCNC: 113 U/L (ref 40–129)
ALT SERPL-CCNC: 51 U/L (ref 10–40)
ANION GAP SERPL CALCULATED.3IONS-SCNC: 13 MMOL/L (ref 9–17)
AST SERPL-CCNC: 44 U/L (ref 15–37)
BILIRUB SERPL-MCNC: 0.5 MG/DL (ref 0–1)
BUN SERPL-MCNC: 32 MG/DL (ref 7–20)
CALCIUM SERPL-MCNC: 9.8 MG/DL (ref 8.3–10.6)
CHLORIDE SERPL-SCNC: 103 MMOL/L (ref 99–110)
CO2 SERPL-SCNC: 20 MMOL/L (ref 21–32)
CREAT SERPL-MCNC: 1.3 MG/DL (ref 0.8–1.3)
ERYTHROCYTE [DISTWIDTH] IN BLOOD BY AUTOMATED COUNT: 13.1 % (ref 11.7–14.9)
GFR, ESTIMATED: 55 ML/MIN/1.73M2
GLUCOSE SERPL-MCNC: 77 MG/DL (ref 74–99)
HCT VFR BLD AUTO: 44.2 % (ref 42–52)
HGB BLD-MCNC: 14.7 G/DL (ref 13.5–18)
MCH RBC QN AUTO: 30.8 PG (ref 27–31)
MCHC RBC AUTO-ENTMCNC: 33.3 G/DL (ref 32–36)
MCV RBC AUTO: 92.7 FL (ref 78–100)
MICROORGANISM SPEC CULT: NORMAL
PLATELET # BLD AUTO: 257 K/UL (ref 140–440)
PMV BLD AUTO: 9.8 FL (ref 7.5–11.1)
POTASSIUM SERPL-SCNC: 4.8 MMOL/L (ref 3.5–5.1)
PROT SERPL-MCNC: 6.9 G/DL (ref 6.4–8.2)
RBC # BLD AUTO: 4.77 M/UL (ref 4.6–6.2)
SODIUM SERPL-SCNC: 136 MMOL/L (ref 136–145)
SPECIMEN DESCRIPTION: NORMAL
WBC OTHER # BLD: 10.6 K/UL (ref 4–10.5)

## 2025-07-07 PROCEDURE — 85027 COMPLETE CBC AUTOMATED: CPT

## 2025-07-07 PROCEDURE — 80053 COMPREHEN METABOLIC PANEL: CPT

## 2025-07-08 LAB
BILIRUB UR QL STRIP: NEGATIVE
CLARITY UR: ABNORMAL
COLOR UR: YELLOW
CRYSTALS URNS MICRO: ABNORMAL /HPF
GLUCOSE UR STRIP-MCNC: NEGATIVE MG/DL
HGB UR QL STRIP.AUTO: NEGATIVE
KETONES UR STRIP-MCNC: NEGATIVE MG/DL
LEUKOCYTE ESTERASE UR QL STRIP: NEGATIVE
MUCOUS THREADS URNS QL MICRO: PRESENT
NITRITE UR QL STRIP: NEGATIVE
PH UR STRIP: 5 [PH] (ref 5–8)
PROT UR STRIP-MCNC: ABNORMAL MG/DL
RBC #/AREA URNS HPF: ABNORMAL /HPF
SP GR UR STRIP: 1.02 (ref 1–1.03)
UROBILINOGEN UR STRIP-ACNC: 0.2 EU/DL (ref 0.2–1)
WBC #/AREA URNS HPF: ABNORMAL /HPF

## 2025-07-14 ENCOUNTER — HOSPITAL ENCOUNTER (OUTPATIENT)
Age: 85
Setting detail: SPECIMEN
Discharge: HOME | End: 2025-07-14
Payer: MEDICARE

## 2025-07-14 LAB
ERYTHROCYTE [DISTWIDTH] IN BLOOD BY AUTOMATED COUNT: 13.5 % (ref 11.7–14.9)
HCT VFR BLD AUTO: 41.7 % (ref 42–52)
HGB BLD-MCNC: 13 G/DL (ref 13.5–18)
MCH RBC QN AUTO: 30.2 PG (ref 27–31)
MCHC RBC AUTO-ENTMCNC: 31.2 G/DL (ref 32–36)
MCV RBC AUTO: 97 FL (ref 78–100)
PLATELET # BLD AUTO: 254 K/UL (ref 140–440)
PMV BLD AUTO: 9.5 FL (ref 7.5–11.1)
RBC # BLD AUTO: 4.3 M/UL (ref 4.6–6.2)
WBC OTHER # BLD: 10.6 K/UL (ref 4–10.5)

## 2025-07-14 PROCEDURE — 85027 COMPLETE CBC AUTOMATED: CPT
